# Patient Record
Sex: MALE | Race: WHITE | NOT HISPANIC OR LATINO | Employment: FULL TIME | URBAN - METROPOLITAN AREA
[De-identification: names, ages, dates, MRNs, and addresses within clinical notes are randomized per-mention and may not be internally consistent; named-entity substitution may affect disease eponyms.]

---
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
## 2018-02-07 ENCOUNTER — PRE VISIT (OUTPATIENT)
Dept: NEUROLOGY | Facility: CLINIC | Age: 18
End: 2018-02-07

## 2018-02-07 NOTE — TELEPHONE ENCOUNTER
PREVISIT INFORMATION                                                    Fernando Mcdonald scheduled for future visit at [facility] specialty clinics.    Patient is scheduled to see Dr. Vyas on 2/9  Reason for visit: muscle twitching/jerking, back pain from degenerative/bulging disk, medically discharged from  due to bulging disk    Referring provider THERON Peck  Has patient seen previous specialist Dr. Ward  Medical Records:  Notes from referring provider in EPIC, MRIs in [facility]/PACS     REVIEW                                                      New patient packet mailed to patient: will come early to fill out   Medication reconciliation complete: No      Current Outpatient Prescriptions   Medication Sig Dispense Refill     cholecalciferol (VITAMIN D) 400 UNIT TABS tablet Take 400 Units by mouth daily       vitamin B complex with vitamin C (VITAMIN  B COMPLEX) TABS tablet Take 3 tablets by mouth daily       DiphenhydrAMINE HCl (BENADRYL PO) Take 25 mg by mouth daily as needed PER PATIENT HAS NOT TAKEN THIS YEAR DUE TO ALLERGY BEING UNDER CONTROL         Allergies: Cefzil [cefprozil]        PLAN/FOLLOW-UP NEEDED                                                          Patient Reminders Given:  Please, make sure you bring an updated list of your medications.   If you are having a procedure, please, present 15 minutes early.  If you need to cancel or reschedule,please call [phone].    Orly Petersen

## 2018-02-09 ENCOUNTER — OFFICE VISIT (OUTPATIENT)
Dept: NEUROLOGY | Facility: CLINIC | Age: 18
End: 2018-02-09
Attending: PHYSICIAN ASSISTANT
Payer: COMMERCIAL

## 2018-02-09 VITALS
HEART RATE: 89 BPM | WEIGHT: 203 LBS | SYSTOLIC BLOOD PRESSURE: 133 MMHG | OXYGEN SATURATION: 99 % | DIASTOLIC BLOOD PRESSURE: 79 MMHG | BODY MASS INDEX: 31.86 KG/M2 | HEIGHT: 67 IN

## 2018-02-09 DIAGNOSIS — M54.50 CHRONIC MIDLINE LOW BACK PAIN WITHOUT SCIATICA: Primary | ICD-10-CM

## 2018-02-09 DIAGNOSIS — G89.29 CHRONIC MIDLINE LOW BACK PAIN WITHOUT SCIATICA: Primary | ICD-10-CM

## 2018-02-09 PROCEDURE — 99204 OFFICE O/P NEW MOD 45 MIN: CPT | Performed by: PSYCHIATRY & NEUROLOGY

## 2018-02-09 ASSESSMENT — PAIN SCALES - GENERAL: PAINLEVEL: SEVERE PAIN (6)

## 2018-02-09 NOTE — PROGRESS NOTES
Department of Neurology  Movement Disorders Division   Initial Clinic Evaluation     Patient: Fernando Mcdonald   MRN: [id number]   : 2000   Date of Visit: 2018     Referring Physician: Mauricio    Reason for Referral: Muscle jerking    Impression:      #1  Functional movement disorder of legs  #2  Chronic low back pain    I was able to observe his leg jerking.  During the exam his right leg began to move involuntarily.  It was a rhythmic tapping at about 2 Hz of his right foot.  During this time there was no movement of his right arm or face and he was alert and oriented.  When he tapped with the left foot the right foot movement was distracted.  I think this is a strong indication of a functional movement disorder.    Recommendations:     #1  Patient was reassured that the movement disorder in his legs is not worrisome.  The findings are strongly those of a functional movement disorder.  This is happening in the setting of chronic pain.  I do not believe that there are any medications that would be of benefit.  I recommended physical therapy.  #2  The patient will undergo a course of physical therapy for his low back pain.  I told him that I thought his pain will improve so with his movement disorder.  I will see him back in 2 months and if his pain has not improved he would like to be referred to a pain management center.  He does not want to consider injections for low back pain.      Please call or write with questions or concerns,    Sincerely yours,    Jan Vyas MD      History of Present Illness  Mr. Mcdonald is a 17 year old ambidextrous male.  This is an extremely pleasant young gentleman.  He is currently working as a manager at [employer].  He says he has had chronic low back pain for 5 years since the age of 12.  He says this runs in his family as his mother has chronic low back pain.  The pain increased in in 2017 he had an MRI of the lumbar spine.  I reviewed this with him.  There is  a central midline disc at L5-S1.  This minimally indents the thecal sac.  There is definitely no spinal stenosis.  There is no root compression.  The patient says he then entered the  and went through training which caused his pain to increased significantly.  He now complains of severe low back pain with radiating radiation to the posterior thighs.  There is no radiation of the calves.  With the pain he gets numbness of his anterior thighs.  There is no numbness of the feet.  His legs fatigue but are not paralyzed.  There are no falls.  There is no fasciculation or atrophy.    The patient saw Dr. Ward for this back in June 2017 when he had his MRI.  Dr. Ward in addition to the ferritin vitamin B12 blood count basic metabolic panel hepatic panel TSH and vitamin D screening.  All of this was normal.    The patient's neurological screening is otherwise normal as recorded below.    Past Medical History:   Past Medical History:   Diagnosis Date     Anxiety     Not taking medication. Saw psychiatrist in 2011/2012. No specific diagnosis and no specific treatment as per patient     Concussion 9/25/2012     Low back pain 2014    No back injury     Syncope 9/25/2012       Past Surgical History:   Past Surgical History:   Procedure Laterality Date     ABDOMEN SURGERY  1999    Pyloric stenosis as infant        Medications:  Current Outpatient Prescriptions   Medication Sig Dispense Refill     BENZONATATE PO Take by mouth every 4 hours as needed for cough       IBUPROFEN PO Take 2-4 tablets by mouth as needed for moderate pain       Naproxen Sodium (ALEVE PO) Take by mouth as needed for moderate pain       ALBUTEROL IN 2-4 puffs every 4 hours as needed       DiphenhydrAMINE HCl (BENADRYL PO) Take 25 mg by mouth daily as needed PER PATIENT HAS NOT TAKEN THIS YEAR DUE TO ALLERGY BEING UNDER CONTROL            Movement Disorder-related Medications                                                                                                                                                              Allergies: is allergic to cefzil [cefprozil].    Social History:   Social History     Social History     Marital status: Single     Spouse name: N/A     Number of children: 0     Years of education: 11     Occupational History     [employer]     1 day     Social History Main Topics     Smoking status: Current Every Day Smoker     Packs/day: 0.50     Types: Cigarettes     Smokeless tobacco: Former User      Comment: Started smoking he was 10 yrs old     Alcohol use No     Drug use: No     Sexual activity: No     Other Topics Concern     Not on file     Social History Narrative    Presently living with biologic mother, who gave pt up for adoption at birth.  Adoptive birth mother  when he was 4, of pancreatic cancer, adoptive father remarried, and this woman forced him out of the house- he re-connected with biologic mother 2014 and now living there.         Family History:  Family History   Problem Relation Age of Onset     Hypertension Mother      Asthma Mother      Thyroid Disease Mother      Breast Cancer No family hx of      Colon Cancer No family hx of      Prostate Cancer No family hx of        ROS:  General:  Fever : no, Chills: no, Sweats: no, Fatigue: YES, ,Weight loss: no  Cardiovascular  Chest Pains: no, Palpitations: no, Edema: no, Shortness of breath: no  Respiratory  Cough: no  Gastrointestinal  Nausea: no, Vomiting: no, Diarrhea: no, Constipation: no  Genitourinary  Dysuria: no, Frequency: no, Urgency: no,  Nocturia: no, Incontinence: no Women:  Abnormal vaginal bleeding: no  Musculoskeletal  Back pain: no, Neck Pain: no  Joint pain, swelling, redness: no, Stiffness: no  Skin  Rash: no, Itching: no,  Suspicious lesions: no  Psychiatric  Depression: no, Anxiety/Panic: no  Endocrine  Cold intolerance: no, Heat intolerance: no, Excessive thirst: no  Hematologic/LymphatiAbnormal bruising,  bleeding: no ,Enlarged lymph nodes: {.:[id number]  Allergic/Immunologic  Hives (Urticaria): no, HIV exposure: no    Neurologic  Visual loss: no, Double vision: no, Headache: no, Loss of consciousness:  no, Seizure: no, Fainting (syncope): no, Dysarthria: no, Dysphagia:  no, Vertigo:  no, Weakness: YES, Atrophy: no, Twitches: no, Lhermitte's: no, Numbness or tingling: YES, Handwriting change: no, Tremors: no, Involuntary movements: YES, Imbalance: no, Abnormal gait:  YES, Falls :no, Memory loss: no, RBD: no, Sleep disorder: no, Hallucination: no, Loss of concentration: no,  Behavioral change: no,  Loss of motivation: no      Comprehensive Neurologic Exam    Mental Status Exam   The patient is alert, oriented and exhibits no difficulty with cognition or memory.  A formal short test of mental status was performed.     Neurovascular         Speech and Language   Right Left     Carotid Bruit Absent Absent  Speech is normal.   Dorsalis Pedis Pulse Normal Normal  Description   Posterior Tibial Pulse Normal Normal  Aphasia is absent     Cranial Nerve Exam                 Right Left   Right Left   II                                        Normal Normal  Hearing (VIII) Normal Normal      III, IV, V!                   Normal Normal  Nystagmus (VIII) Normal Normal   EOM description                              Gag (IX, X) Normal Normal   Facial Sensation (V) Normal Normal  SCM (XI) Normal Normal   Muscles of Mastication (V) Normal Normal  Trapezius (XI) Normal Normal   Facial Strength (VII) Normal Normal  Tongue (XII) Normal Normal     Motor Exam  Strength (*/5 grading)  Muscle                  Right Left  Muscle Right Left   Frontalis                                           Normal Normal  Iliopsoas Normal    Normal          Orbicularis Oculi                     Normal Normal  Quadrideps Normal    Normal        Orbicularis Oris                         Normal Normal  Anterior Tibial Normal Normal      Deltoid Normal Normal   Gastrocnemius Normal    Normal   Biceps Normal Normal  Extensor Hallucis Longus Normal    Normal   Triceps Normal Normal  Toe Extensors Normal    Normal   EDC Normal Normal  Toe Flexor Normal    Normal   Finger Flexors Normal Normal  Other Normal Normal   First Dorsal Interosseous Normal Normal  Other Normal Normal   Hypothenar Normal Normal  Other Normal Normal   Thenar Normal Normal  Other Normal Normal     Reflexes      Tone   Right Left   Right Left   Biceps Normal Normal  Spasticity (S)  Rigidity (R)     Triceps Normal Normal  Neck     Brachioradialis Normal Normal  Arm     Quadriceps Normal Normal  Leg     Ankle Normal Normal       Babkinski Flexor Flexor         AMR       Coordination   Right Left   Right Left   Fingers Normal Normal  Finger nose finger Normal Normal   Hand Normal Normal  Drift Normal Normal   Leg Normal Normal  Heel Sotelo Normal Normal   Foot Normal Normal  Other     Other               Involuntary Movements    Gait and Station  None            Right Left  Stand & Sit Normal   (Movement type) Normal Normal  Gait Normal   (Movement type) Normal Normal  Tandem Normal   (Movement type) Normal Normal  Romberg Absent     During the exam the patient developed involuntary movements of the right leg.  This was like a shaking movement of the leg.  There were no movements of the arm or face and the patient was alert and able to answer questions.  When I had him tap with the left leg at a slower frequency the right leg just shaking stopped  Sensory Exam          Right Left    Pin Normal Normal    Vibration Normal Normal    Joint position Normal Normal    Other             Coding statement:     Duration of  Services: face-to-face 45 min.   Greater than 50% of this visit was spent in counseling and coordination of care.        Number of diagnoses:[id number]  ManagementDiagnostic tests orders or reviewed.   Complexity of medical data:Outside records reviewed.  Radiology images reviewed by myself.   Review/order clinical lab tests.   Risk

## 2018-02-09 NOTE — NURSING NOTE
"Fernando Mcdonald's goals for this visit include: consult  He requests these members of his care team be copied on today's visit information:     PCP: Kyler Nagy    Referring Provider:  Xenia Martínez PA-C  [address] [place] [address]    Chief Complaint   Patient presents with     Consult       Initial /79  Pulse 89  Ht 1.695 m (5' 6.75\")  Wt 92.1 kg (203 lb)  SpO2 99%  BMI 32.03 kg/m2 Estimated body mass index is 32.03 kg/(m^2) as calculated from the following:    Height as of this encounter: 1.695 m (5' 6.75\").    Weight as of this encounter: 92.1 kg (203 lb).  Medication Reconciliation: complete    Do you need any medication refills at today's visit? n  "

## 2018-02-09 NOTE — MR AVS SNAPSHOT
After Visit Summary   2/9/2018    Fernando Mcdonald    MRN: [id number]           Patient Information     Date Of Birth          2000        Visit Information        Provider Department      2/9/2018 2:00 PM Jan Vyas MD [facility]        Today's Diagnoses     Chronic midline low back pain without sciatica    -  1      Care Instructions    #1  Physical therapy for low back pain  #2  Return to clinic 2 months          Follow-ups after your visit        Additional Services     PHYSICAL THERAPY REFERRAL       *This therapy referral will be filtered to a centralized scheduling office at [facility] and the patient will receive a call to schedule an appointment at a [facility] location most convenient for them. *     [facility] provides Physical Therapy evaluation and treatment and many specialty services across the [facility] system.  If requesting a specialty program, please choose from the list below.    If you have not heard from the scheduling office within 2 business days, please call [phone] for all locations, with the exception of [place], please call [phone].  Treatment: Evaluation & Treatment  Please be aware that coverage of these services is subject to the terms and limitations of your health insurance plan.  Call member services at your health plan with any benefit or coverage questions.                  Follow-up notes from your care team     Return in about 2 months (around 4/9/2018).      Your next 10 appointments already scheduled     Apr 13, 2018  2:30 PM CDT   Return Visit with Jan Vyas MD   [facility] ([facility])    [id number] [place] [id number]   [phone]              Future tests that were ordered for you today     Open Future Orders        Priority Expected Expires Ordered    PHYSICAL THERAPY REFERRAL Routine  6/9/2018 2/9/2018            Who to  "contact     If you have questions or need follow up information about today's clinic visit or your schedule please contact [facility] directly at [phone].  Normal or non-critical lab and imaging results will be communicated to you by MyChart, letter or phone within 4 business days after the clinic has received the results. If you do not hear from us within 7 days, please contact the clinic through MyChart or phone. If you have a critical or abnormal lab result, we will notify you by phone as soon as possible.  Submit refill requests through [employer] or call your pharmacy and they will forward the refill request to us. Please allow 3 business days for your refill to be completed.          Additional Information About Your Visit        [employer] Information     [employer] is an electronic gateway that provides easy, online access to your medical records. With [employer], you can request a clinic appointment, read your test results, renew a prescription or communicate with your care team.     To sign up for [employer] visit the website at [email or web address]   You will be asked to enter the access code listed below, as well as some personal information. Please follow the directions to create your username and password.     Your access code is: [email or web address]  Expires: 5/10/2018  2:52 PM     Your access code will  in 90 days. If you need help or a new code, please contact your [facility] or call [phone] for assistance.        Care EveryWhere ID     This is your Care EveryWhere ID. This could be used by other organizations to access your [facility] medical records  [id number]        Your Vitals Were     Pulse Height Pulse Oximetry BMI (Body Mass Index)          89 1.695 m (5' 6.75\") 99% 32.03 kg/m2         Blood Pressure from Last 3 Encounters:   18 133/79   17 130/76   17 132/76    Weight from Last 3 Encounters:   18 92.1 kg (203 lb) (94 " %)*   06/13/17 88 kg (194 lb) (92 %)*   06/13/17 88.1 kg (194 lb 3.2 oz) (92 %)*     * Growth percentiles are based on CDC 2-20 Years data.               Primary Care Provider Office Phone # Fax #    Kyler Hunter [facility] [phone]       [address] [facility] [facility]  [place] [address]        Equal Access to Services     NICOLE BROWN : Hadii aad ku hadasho Soomaali, waaxda luqadaha, qaybta kaalmada adeegyada, waxay idiin hayaan adeeg kharash la'aysha . So [facility] [phone].    ATENCIÓN: Si habla español, tiene a jacobson disposición servicios gratuitos de asistencia lingüística. Llame al [phone].    We comply with applicable federal civil rights laws and [place] laws. We do not discriminate on the basis of race, color, national origin, age, disability, sex, sexual orientation, or gender identity.            Thank you!     Thank you for choosing [facility]  for your care. Our goal is always to provide you with excellent care. Hearing back from our patients is one way we can continue to improve our services. Please take a few minutes to complete the written survey that you may receive in the mail after your visit with us. Thank you!             Your Updated Medication List - Protect others around you: Learn how to safely use, store and throw away your medicines at www.disposemymeds.org.          This list is accurate as of 2/9/18  2:52 PM.  Always use your most recent med list.                   Brand Name Dispense Instructions for use Diagnosis    ALBUTEROL IN      2-4 puffs every 4 hours as needed        ALEVE PO      Take by mouth as needed for moderate pain        BENADRYL PO      Take 25 mg by mouth daily as needed PER PATIENT HAS NOT TAKEN THIS YEAR DUE TO ALLERGY BEING UNDER CONTROL        BENZONATATE PO      Take by mouth every 4 hours as needed for cough        IBUPROFEN PO      Take 2-4 tablets by mouth as needed for moderate pain

## 2018-02-09 NOTE — LETTER
2018         RE: Fernando Mcdonald  [address] [facility]   [facility] [address]        Dear Colleague,    Thank you for referring your patient, Fernando Mcdonald, to the [facility]. Please see a copy of my visit note below.    Department of Neurology  Movement Disorders Division   Initial Clinic Evaluation     Patient: Fernando Mcdonald   MRN: [id number]   : 2000   Date of Visit: 2018     Referring Physician: Mauricio    Reason for Referral: Muscle jerking    Impression:      #1  Functional movement disorder of legs  #2  Chronic low back pain    I was able to observe his leg jerking.  During the exam his right leg began to move involuntarily.  It was a rhythmic tapping at about 2 Hz of his right foot.  During this time there was no movement of his right arm or face and he was alert and oriented.  When he tapped with the left foot the right foot movement was distracted.  I think this is a strong indication of a functional movement disorder.    Recommendations:     #1  Patient was reassured that the movement disorder in his legs is not worrisome.  The findings are strongly those of a functional movement disorder.  This is happening in the setting of chronic pain.  I do not believe that there are any medications that would be of benefit.  I recommended physical therapy.  #2  The patient will undergo a course of physical therapy for his low back pain.  I told him that I thought his pain will improve so with his movement disorder.  I will see him back in 2 months and if his pain has not improved he would like to be referred to a pain management center.  He does not want to consider injections for low back pain.      Please call or write with questions or concerns,    Sincerely yours,    Jan Vyas MD      History of Present Illness  Mr. Mcdonald is a 17 year old ambidextrous male.  This is an extremely pleasant young gentleman.  He is currently working as a manager at [employer].  He says  he has had chronic low back pain for 5 years since the age of 12.  He says this runs in his family as his mother has chronic low back pain.  The pain increased in in June 2017 he had an MRI of the lumbar spine.  I reviewed this with him.  There is a central midline disc at L5-S1.  This minimally indents the thecal sac.  There is definitely no spinal stenosis.  There is no root compression.  The patient says he then entered the  and went through training which caused his pain to increased significantly.  He now complains of severe low back pain with radiating radiation to the posterior thighs.  There is no radiation of the calves.  With the pain he gets numbness of his anterior thighs.  There is no numbness of the feet.  His legs fatigue but are not paralyzed.  There are no falls.  There is no fasciculation or atrophy.    The patient saw Dr. Ward for this back in June 2017 when he had his MRI.  Dr. Ward in addition to the ferritin vitamin B12 blood count basic metabolic panel hepatic panel TSH and vitamin D screening.  All of this was normal.    The patient's neurological screening is otherwise normal as recorded below.    Past Medical History:   Past Medical History:   Diagnosis Date     Anxiety     Not taking medication. Saw psychiatrist in 2011/2012. No specific diagnosis and no specific treatment as per patient     Concussion 9/25/2012     Low back pain 2014    No back injury     Syncope 9/25/2012       Past Surgical History:   Past Surgical History:   Procedure Laterality Date     ABDOMEN SURGERY  1999    Pyloric stenosis as infant        Medications:  Current Outpatient Prescriptions   Medication Sig Dispense Refill     BENZONATATE PO Take by mouth every 4 hours as needed for cough       IBUPROFEN PO Take 2-4 tablets by mouth as needed for moderate pain       Naproxen Sodium (ALEVE PO) Take by mouth as needed for moderate pain       ALBUTEROL IN 2-4 puffs every 4 hours as needed        DiphenhydrAMINE HCl (BENADRYL PO) Take 25 mg by mouth daily as needed PER PATIENT HAS NOT TAKEN THIS YEAR DUE TO ALLERGY BEING UNDER CONTROL            Movement Disorder-related Medications                                                                                                                                                             Allergies: is allergic to cefzil [cefprozil].    Social History:   Social History     Social History     Marital status: Single     Spouse name: N/A     Number of children: 0     Years of education: 11     Occupational History     Guest [employer]     1 day     Social History Main Topics     Smoking status: Current Every Day Smoker     Packs/day: 0.50     Types: Cigarettes     Smokeless tobacco: Former User      Comment: Started smoking he was 10 yrs old     Alcohol use No     Drug use: No     Sexual activity: No     Other Topics Concern     Not on file     Social History Narrative    Presently living with biologic mother, who gave pt up for adoption at birth.  Adoptive birth mother  when he was 4, of pancreatic cancer, adoptive father remarried, and this woman forced him out of the house- he re-connected with biologic mother 2014 and now living there.         Family History:  Family History   Problem Relation Age of Onset     Hypertension Mother      Asthma Mother      Thyroid Disease Mother      Breast Cancer No family hx of      Colon Cancer No family hx of      Prostate Cancer No family hx of        ROS:  General:  Fever : no, Chills: no, Sweats: no, Fatigue: YES, ,Weight loss: no  Cardiovascular  Chest Pains: no, Palpitations: no, Edema: no, Shortness of breath: no  Respiratory  Cough: no  Gastrointestinal  Nausea: no, Vomiting: no, Diarrhea: no, Constipation: no  Genitourinary  Dysuria: no, Frequency: no, Urgency: no,  Nocturia: no, Incontinence: no Women:  Abnormal vaginal bleeding: no  Musculoskeletal  Back pain: no, Neck Pain:  no  Joint pain, swelling, redness: no, Stiffness: no  Skin  Rash: no, Itching: no,  Suspicious lesions: no  Psychiatric  Depression: no, Anxiety/Panic: no  Endocrine  Cold intolerance: no, Heat intolerance: no, Excessive thirst: no  Hematologic/LymphatiAbnormal bruising, bleeding: no ,Enlarged lymph nodes: {.:[id number]  Allergic/Immunologic  Hives (Urticaria): no, HIV exposure: no    Neurologic  Visual loss: no, Double vision: no, Headache: no, Loss of consciousness:  no, Seizure: no, Fainting (syncope): no, Dysarthria: no, Dysphagia:  no, Vertigo:  no, Weakness: YES, Atrophy: no, Twitches: no, Lhermitte's: no, Numbness or tingling: YES, Handwriting change: no, Tremors: no, Involuntary movements: YES, Imbalance: no, Abnormal gait:  YES, Falls :no, Memory loss: no, RBD: no, Sleep disorder: no, Hallucination: no, Loss of concentration: no,  Behavioral change: no,  Loss of motivation: no      Comprehensive Neurologic Exam    Mental Status Exam   The patient is alert, oriented and exhibits no difficulty with cognition or memory.  A formal short test of mental status was performed.     Neurovascular         Speech and Language   Right Left     Carotid Bruit Absent Absent  Speech is normal.   Dorsalis Pedis Pulse Normal Normal  Description   Posterior Tibial Pulse Normal Normal  Aphasia is absent     Cranial Nerve Exam                 Right Left   Right Left   II                                        Normal Normal  Hearing (VIII) Normal Normal      III, IV, V!                   Normal Normal  Nystagmus (VIII) Normal Normal   EOM description                              Gag (IX, X) Normal Normal   Facial Sensation (V) Normal Normal  SCM (XI) Normal Normal   Muscles of Mastication (V) Normal Normal  Trapezius (XI) Normal Normal   Facial Strength (VII) Normal Normal  Tongue (XII) Normal Normal     Motor Exam  Strength (*/5 grading)  Muscle                  Right Left  Muscle Right Left   Frontalis                                            Normal Normal  Iliopsoas Normal    Normal          Orbicularis Oculi                     Normal Normal  Quadrideps Normal    Normal        Orbicularis Oris                         Normal Normal  Anterior Tibial Normal Normal      Deltoid Normal Normal  Gastrocnemius Normal    Normal   Biceps Normal Normal  Extensor Hallucis Longus Normal    Normal   Triceps Normal Normal  Toe Extensors Normal    Normal   EDC Normal Normal  Toe Flexor Normal    Normal   Finger Flexors Normal Normal  Other Normal Normal   First Dorsal Interosseous Normal Normal  Other Normal Normal   Hypothenar Normal Normal  Other Normal Normal   Thenar Normal Normal  Other Normal Normal     Reflexes      Tone   Right Left   Right Left   Biceps Normal Normal  Spasticity (S)  Rigidity (R)     Triceps Normal Normal  Neck     Brachioradialis Normal Normal  Arm     Quadriceps Normal Normal  Leg     Ankle Normal Normal       Babkinski Flexor Flexor         AMR       Coordination   Right Left   Right Left   Fingers Normal Normal  Finger nose finger Normal Normal   Hand Normal Normal  Drift Normal Normal   Leg Normal Normal  Heel Sotelo Normal Normal   Foot Normal Normal  Other     Other               Involuntary Movements    Gait and Station  None            Right Left  Stand & Sit Normal   (Movement type) Normal Normal  Gait Normal   (Movement type) Normal Normal  Tandem Normal   (Movement type) Normal Normal  Romberg Absent     During the exam the patient developed involuntary movements of the right leg.  This was like a shaking movement of the leg.  There were no movements of the arm or face and the patient was alert and able to answer questions.  When I had him tap with the left leg at a slower frequency the right leg just shaking stopped  Sensory Exam          Right Left    Pin Normal Normal    Vibration Normal Normal    Joint position Normal Normal    Other             Coding statement:     Duration of  Services: face-to-face 45 min.    Greater than 50% of this visit was spent in counseling and coordination of care.        Number of diagnoses:[id number]  ManagementDiagnostic tests orders or reviewed.   Complexity of medical data:Outside records reviewed.  Radiology images reviewed by myself.  Review/order clinical lab tests.   Risk                         Again, thank you for allowing me to participate in the care of your patient.        Sincerely,        Jan Vyas MD

## 2018-02-16 ENCOUNTER — THERAPY VISIT (OUTPATIENT)
Dept: PHYSICAL THERAPY | Facility: CLINIC | Age: 18
End: 2018-02-16
Payer: COMMERCIAL

## 2018-02-16 DIAGNOSIS — G89.29 CHRONIC LOW BACK PAIN WITH BILATERAL SCIATICA, UNSPECIFIED BACK PAIN LATERALITY: Primary | ICD-10-CM

## 2018-02-16 DIAGNOSIS — M54.42 CHRONIC LOW BACK PAIN WITH BILATERAL SCIATICA, UNSPECIFIED BACK PAIN LATERALITY: Primary | ICD-10-CM

## 2018-02-16 DIAGNOSIS — M54.41 CHRONIC LOW BACK PAIN WITH BILATERAL SCIATICA, UNSPECIFIED BACK PAIN LATERALITY: Primary | ICD-10-CM

## 2018-02-16 PROBLEM — M54.50 CHRONIC BILATERAL LOW BACK PAIN WITHOUT SCIATICA: Status: RESOLVED | Noted: 2018-02-16 | Resolved: 2018-02-16

## 2018-02-16 PROBLEM — M54.50 CHRONIC BILATERAL LOW BACK PAIN WITHOUT SCIATICA: Status: ACTIVE | Noted: 2018-02-16

## 2018-02-16 PROCEDURE — 97162 PT EVAL MOD COMPLEX 30 MIN: CPT | Mod: GP | Performed by: PHYSICAL THERAPIST

## 2018-02-16 PROCEDURE — 97110 THERAPEUTIC EXERCISES: CPT | Mod: GP | Performed by: PHYSICAL THERAPIST

## 2018-02-16 NOTE — MR AVS SNAPSHOT
After Visit Summary   2/16/2018    Fernando Mcdonald    MRN: [id number]           Patient Information     Date Of Birth          2000        Visit Information        Provider Department      2/16/2018 1:30 PM Hema Magana PT [facility] [place] [facility]        Today's Diagnoses     Chronic low back pain with bilateral sciatica, unspecified back pain laterality    -  1       Follow-ups after your visit        Your next 10 appointments already scheduled     Feb 23, 2018  8:20 AM CST   [facility] with Hema Magana PT   [facility] [place] [facility] ([facility] [place])    [address]  [place] [address]   [phone]            Mar 02, 2018  2:10 PM CST   [facility] with Hema Magana PT   [facility] [place] [facility] ([facility] [place])    [address]  [place] [address]   [phone]            Apr 13, 2018  2:30 PM CDT   Return Visit with Jan Vyas MD   [facility]    [address] [place] [id number] [phone]              Who to contact     If you have questions or need follow up information about today's clinic visit or your schedule please contact [facility] directly at [phone].  Normal or non-critical lab and imaging results will be communicated to you by MyChart, letter or phone within 4 business days after the clinic has received the results. If you do not hear from us within 7 days, please contact the clinic through MyChart or phone. If you have a critical or abnormal lab result, we will notify you by phone as soon as possible.  Submit refill requests through [employer] or call your pharmacy and they will forward the refill request to us. Please allow 3 business days for your refill to be completed.          Additional Information About Your Visit        MyChart Information      "[employer] lets you send messages to your doctor, view your test results, renew your prescriptions, schedule appointments and more. To sign up, go to [email or web address] . Click on \"Log in\" on the left side of the screen, which will take you to the Welcome page. Then click on \"Sign up Now\" on the right side of the page.     You will be asked to enter the access code listed below, as well as some personal information. Please follow the directions to create your username and password.     Your access code is: [email or web address]  Expires: 5/10/2018  2:52 PM     Your access code will  in 90 days. If you need help or a new code, please call your [place] clinic or [phone].        Care EveryWhere ID     This is your Care EveryWhere ID. This could be used by other organizations to access your [place] medical records  [id number]         Blood Pressure from Last 3 Encounters:   18 133/79   17 130/76   17 132/76    Weight from Last 3 Encounters:   18 92.1 kg (203 lb) (94 %)*   17 88 kg (194 lb) (92 %)*   17 88.1 kg (194 lb 3.2 oz) (92 %)*     * Growth percentiles are based on [facility] 2-20 Years data.              We Performed the Following     HC PT EVAL, MODERATE COMPLEXITY     THERAPEUTIC EXERCISES        Primary Care Provider Office Phone # Fax #    [facility] [phone]       [address] [facility] [address]        Equal Access to Services     [facility] : Hadii aad ku hadasho Soomaali, waaxda luqadaha, qaybta kaalmada adeegyada, marianna gallegos. So [facility] [phone].    ATENCIÓN: Si habla español, tiene a jacobson disposición servicios gratuitos de asistencia lingüística. Llame al [phone].    We comply with applicable federal civil rights laws and [place] laws. We do not discriminate on the basis of race, color, national origin, age, disability, sex, sexual orientation, or gender identity.            Thank you!     " Thank you for choosing [facility]  for your care. Our goal is always to provide you with excellent care. Hearing back from our patients is one way we can continue to improve our services. Please take a few minutes to complete the written survey that you may receive in the mail after your visit with us. Thank you!             Your Updated Medication List - Protect others around you: Learn how to safely use, store and throw away your medicines at www.disposemymeds.org.          This list is accurate as of 2/16/18  5:08 PM.  Always use your most recent med list.                   Brand Name Dispense Instructions for use Diagnosis    ALBUTEROL IN      2-4 puffs every 4 hours as needed        ALEVE PO      Take by mouth as needed for moderate pain        BENADRYL PO      Take 25 mg by mouth daily as needed PER PATIENT HAS NOT TAKEN THIS YEAR DUE TO ALLERGY BEING UNDER CONTROL        BENZONATATE PO      Take by mouth every 4 hours as needed for cough        IBUPROFEN PO      Take 2-4 tablets by mouth as needed for moderate pain

## 2018-02-16 NOTE — PROGRESS NOTES
[facility] Initial Evaluation  Subjective:  Patient is a 17 year old male presenting with rehab back hpi. The history is provided by the patient. No  was used.   Fernando Mcdonald is a 17 year old male with a lumbar condition.  Condition occurred with:  Insidious onset.  Condition occurred: for unknown reasons.  This is a chronic condition  Chronic LBP for over 4 years for unknown reasons.  Pt was referred to PT on 2/9/18.    Patient reports pain:  Lower lumbar spine.  Radiates to:  Foot right and foot left.   and is intermittent and reported as 8/10.  Associated symptoms:  Loss of strength, tingling, numbness and loss of motion/stiffness. Pain is the same all the time.  Symptoms are exacerbated by bending, twisting, sitting and standing and relieved by rest (lying down).    Special tests:  MRI (L5-S1: Disc bulge with superimposed left central protrusion).      General health as reported by patient is good.  Pertinent medical history includes:  Smoking.  Medical allergies: yes (Cefzil).  Other surgeries include:  Other.    Current occupation is .  Patient is working in normal job without restrictions.  Primary job tasks include:  Prolonged sitting.    Barriers include:  None as reported by the patient.    Red flags:  Changes in bowel and bladder habits.                        Objective:  System         Lumbar/SI Evaluation  ROM:    AROM Lumbar:   Flexion:          Moderate loss (++)  Ext:                    Major loss (+++)   Side Bend:        Left:  Moderate loss (++)    Right:  Moderate loss (++)  Rotation:           Left:     Right:   Side Glide:        Left:     Right:           Lumbar Myotomes:  normal            Lumbar DTR's:  normal        Lumbar Dermtomes:  normal                Neural Tension/Mobility:    Left side:  SLR positive.   Right side:   SLR positive.  Lumbar Palpation:    Tenderness present at Left:    Quadratus Lumborum; Erector Spinae and  PSIS  Tenderness present at Right: Quadratus Lumborum; Erector Spinae and PSIS    Lumbar Provocation:    Left positive with:  PROM hip  Right positive with: PROM hip                                                 General     ROS    Assessment/Plan:    Patient is a 17 year old male with lumbar complaints.    Patient has the following significant findings with corresponding treatment plan.                Diagnosis 1:  LBP  Pain -  manual therapy, self management, education, directional preference exercise and home program  Decreased ROM/flexibility - manual therapy, therapeutic exercise and home program  Decreased function - therapeutic activities and home program  Impaired posture - neuro re-education and home program    Therapy Evaluation Codes:   1) History comprised of:   Personal factors that impact the plan of care:      Anxiety, Overall behavior pattern, Past/current experiences and Time since onset of symptoms.    Comorbidity factors that impact the plan of care are:      Pain at night/rest and Smoking.     Medications impacting care: None.  2) Examination of Body Systems comprised of:   Body structures and functions that impact the plan of care:      Lumbar spine.   Activity limitations that impact the plan of care are:      Bathing, Lifting, Sitting and Walking.  3) Clinical presentation characteristics are:   Evolving/Changing.  4) Decision-Making    Moderate complexity using standardized patient assessment instrument and/or measureable assessment of functional outcome.  Cumulative Therapy Evaluation is: Moderate complexity.    Previous and current functional limitations:  (See Goal Flow Sheet for this information)    Short term and Long term goals: (See Goal Flow Sheet for this information)     Communication ability:  Patient appears to be able to clearly communicate and understand verbal and written communication and follow directions correctly.  Treatment Explanation - The following has been discussed  with the patient:   RX ordered/plan of care  Anticipated outcomes  Possible risks and side effects  This patient would benefit from PT intervention to resume normal activities.   Rehab potential is fair due to chronicity and low tolerance for activity currently.    Frequency:  1 X week, once daily  Duration:  for 6 weeks  Discharge Plan:  Achieve all LTG.  Independent in home treatment program.  Reach maximal therapeutic benefit.    Please refer to the daily flowsheet for treatment today, total treatment time and time spent performing 1:1 timed codes.

## 2018-02-23 ENCOUNTER — THERAPY VISIT (OUTPATIENT)
Dept: PHYSICAL THERAPY | Facility: CLINIC | Age: 18
End: 2018-02-23
Payer: COMMERCIAL

## 2018-02-23 ENCOUNTER — OFFICE VISIT (OUTPATIENT)
Dept: INTERNAL MEDICINE | Facility: CLINIC | Age: 18
End: 2018-02-23
Payer: COMMERCIAL

## 2018-02-23 VITALS
DIASTOLIC BLOOD PRESSURE: 81 MMHG | WEIGHT: 203 LBS | RESPIRATION RATE: 16 BRPM | TEMPERATURE: 98.4 F | BODY MASS INDEX: 31.86 KG/M2 | HEIGHT: 67 IN | HEART RATE: 106 BPM | SYSTOLIC BLOOD PRESSURE: 126 MMHG

## 2018-02-23 DIAGNOSIS — Z87.891 PERSONAL HISTORY OF NICOTINE DEPENDENCE: Primary | ICD-10-CM

## 2018-02-23 DIAGNOSIS — G89.29 CHRONIC BILATERAL LOW BACK PAIN WITHOUT SCIATICA: ICD-10-CM

## 2018-02-23 DIAGNOSIS — G89.29 CHRONIC LOW BACK PAIN WITH BILATERAL SCIATICA, UNSPECIFIED BACK PAIN LATERALITY: ICD-10-CM

## 2018-02-23 DIAGNOSIS — M54.50 CHRONIC BILATERAL LOW BACK PAIN WITHOUT SCIATICA: ICD-10-CM

## 2018-02-23 DIAGNOSIS — M54.41 CHRONIC LOW BACK PAIN WITH BILATERAL SCIATICA, UNSPECIFIED BACK PAIN LATERALITY: ICD-10-CM

## 2018-02-23 DIAGNOSIS — R35.89 POLYURIA: ICD-10-CM

## 2018-02-23 DIAGNOSIS — M54.42 CHRONIC LOW BACK PAIN WITH BILATERAL SCIATICA, UNSPECIFIED BACK PAIN LATERALITY: ICD-10-CM

## 2018-02-23 DIAGNOSIS — R63.1 POLYDIPSIA: ICD-10-CM

## 2018-02-23 DIAGNOSIS — F41.9 ANXIETY: ICD-10-CM

## 2018-02-23 LAB
ALBUMIN UR-MCNC: NEGATIVE MG/DL
APPEARANCE UR: CLEAR
BILIRUB UR QL STRIP: ABNORMAL
COLOR UR AUTO: YELLOW
GLUCOSE UR STRIP-MCNC: NEGATIVE MG/DL
HBA1C MFR BLD: 5.5 % (ref 4.3–6)
HGB UR QL STRIP: NEGATIVE
KETONES UR STRIP-MCNC: ABNORMAL MG/DL
LEUKOCYTE ESTERASE UR QL STRIP: NEGATIVE
NITRATE UR QL: NEGATIVE
PH UR STRIP: 5.5 PH (ref 5–7)
SOURCE: ABNORMAL
SP GR UR STRIP: 1.02 (ref 1–1.03)
UROBILINOGEN UR STRIP-ACNC: 1 EU/DL (ref 0.2–1)

## 2018-02-23 PROCEDURE — 99214 OFFICE O/P EST MOD 30 MIN: CPT | Performed by: INTERNAL MEDICINE

## 2018-02-23 PROCEDURE — 36415 COLL VENOUS BLD VENIPUNCTURE: CPT | Performed by: INTERNAL MEDICINE

## 2018-02-23 PROCEDURE — 97110 THERAPEUTIC EXERCISES: CPT | Mod: GP | Performed by: PHYSICAL THERAPIST

## 2018-02-23 PROCEDURE — 83036 HEMOGLOBIN GLYCOSYLATED A1C: CPT | Performed by: INTERNAL MEDICINE

## 2018-02-23 PROCEDURE — 97112 NEUROMUSCULAR REEDUCATION: CPT | Mod: GP | Performed by: PHYSICAL THERAPIST

## 2018-02-23 PROCEDURE — 81003 URINALYSIS AUTO W/O SCOPE: CPT | Performed by: INTERNAL MEDICINE

## 2018-02-23 PROCEDURE — 99406 BEHAV CHNG SMOKING 3-10 MIN: CPT | Performed by: INTERNAL MEDICINE

## 2018-02-23 ASSESSMENT — ANXIETY QUESTIONNAIRES
IF YOU CHECKED OFF ANY PROBLEMS ON THIS QUESTIONNAIRE, HOW DIFFICULT HAVE THESE PROBLEMS MADE IT FOR YOU TO DO YOUR WORK, TAKE CARE OF THINGS AT HOME, OR GET ALONG WITH OTHER PEOPLE: SOMEWHAT DIFFICULT
GAD7 TOTAL SCORE: 14
5. BEING SO RESTLESS THAT IT IS HARD TO SIT STILL: MORE THAN HALF THE DAYS
6. BECOMING EASILY ANNOYED OR IRRITABLE: SEVERAL DAYS
3. WORRYING TOO MUCH ABOUT DIFFERENT THINGS: MORE THAN HALF THE DAYS
7. FEELING AFRAID AS IF SOMETHING AWFUL MIGHT HAPPEN: SEVERAL DAYS
1. FEELING NERVOUS, ANXIOUS, OR ON EDGE: NEARLY EVERY DAY
2. NOT BEING ABLE TO STOP OR CONTROL WORRYING: MORE THAN HALF THE DAYS

## 2018-02-23 ASSESSMENT — PATIENT HEALTH QUESTIONNAIRE - PHQ9: 5. POOR APPETITE OR OVEREATING: NEARLY EVERY DAY

## 2018-02-23 NOTE — PROGRESS NOTES
Subjective:  HPI                    Objective:  System    Physical Exam    General     ROS    Assessment/Plan:    SUBJECTIVE  Subjective: Has been trying to lay EBER but can only make it about 45 seconds and then stops due to pain in both legs.  States pain is equal in both legs and goes all the way to the feet   Current Pain level: No change   Changes in function:  None     Adverse reaction to treatment or activity:  None    OBJECTIVE  Objective: Couldn't tolerate prone due to LBP and B LE pain.  Did the best with hooklying position.  Involuntary twitching of hands and LEs was observed throughout majority of the session.  This has been discussed with neurologist and no clear cause identified     ASSESSMENT  Fernando continues to require intervention to meet STG and LTG's: PT  No change of symptoms has been noted.  Response to therapy has shown lack of progress in  pain level and radicular symptoms  Progress made towards STG/LTG?  None    PLAN  Stop prone lying.  Start light core strengthening    PTA/ATC plan:  N/A    Please refer to the daily flowsheet for treatment today, total treatment time and time spent performing 1:1 timed codes.

## 2018-02-23 NOTE — MR AVS SNAPSHOT
"              After Visit Summary   2/23/2018    Fernando Mcdonald    MRN: [id number]           Patient Information     Date Of Birth          2000        Visit Information        Provider Department      2/23/2018 11:00 AM Willi Tejada MD [facility] [place]        Today's Diagnoses     Personal history of nicotine dependence    -  1    Polyuria        Polydipsia           Follow-ups after your visit        Your next 10 appointments already scheduled     Mar 02, 2018  2:10 PM CST   JEOVANNY Spine with Hema Magana PT   [facility] [place] [facility] ([facility] [place])    [address]  [address]  [place] [address] [phone]            Apr 13, 2018  2:30 PM CDT   Return Visit with Jan Vyas MD   [facility]    [address] [place] [id number] [phone]              Who to contact     Normal or non-critical lab and imaging results will be communicated to you by EQUISOhart, letter or phone within 4 business days after the clinic has received the results. If you do not hear from us within 7 days, please contact the clinic through EQUISOhart or phone. If you have a critical or abnormal lab result, we will notify you by phone as soon as possible.  Submit refill requests through CrowdStreet or call your pharmacy and they will forward the refill request to us. Please allow 3 business days for your refill to be completed.          If you need to speak with a  for additional information , please call: [phone]             Additional Information About Your Visit        CrowdStreet Information     CrowdStreet lets you send messages to your doctor, view your test results, renew your prescriptions, schedule appointments and more. To sign up, go to [email or web address] . Click on \"Log in\" on the left side of the screen, which will take you to the Welcome page. Then click on \"Sign up Now\" on the right side of " "the page.     You will be asked to enter the access code listed below, as well as some personal information. Please follow the directions to create your username and password.     Your access code is: [email or web address]  Expires: 5/10/2018  2:52 PM     Your access code will  in 90 days. If you need help or a new code, please call your [place] clinic or [phone].        Care EveryWhere ID     This is your Care EveryWhere ID. This could be used by other organizations to access your [place] medical records  [id number]        Your Vitals Were     Pulse Temperature Respirations Height BMI (Body Mass Index)       106 98.4  F (36.9  C) (Oral) 16 5' 6.75\" (1.695 m) 32.03 kg/m2        Blood Pressure from Last 3 Encounters:   18 126/81   18 133/79   17 130/76    Weight from Last 3 Encounters:   18 203 lb (92.1 kg) (94 %)*   18 203 lb (92.1 kg) (94 %)*   17 194 lb (88 kg) (92 %)*     * Growth percentiles are based on CDC 2-20 Years data.              We Performed the Following     *UA reflex to Microscopic and Culture ([place] and [facility] (except [place] and [place])     Hemoglobin A1c     SMOKING CESSATION COUNSELING 3-10 MIN        Primary Care Provider Office Phone # Fax #    [facility] [phone] [address] [facility] [address]        Equal Access to Services     LUIS A BROWN : Hadii shyam ku hadasho Sosushilali, waaxda luqadaha, qaybta kaalmada adeegyada, marianna gallegos. So [facility] [phone].    ATENCIÓN: Si habla español, tiene a jacobson disposición servicios gratuitos de asistencia lingüística. Llame al [phone].    We comply with applicable federal civil rights laws and [place] laws. We do not discriminate on the basis of race, color, national origin, age, disability, sex, sexual orientation, or gender identity.            Thank you!     Thank you for choosing [facility]  for your care. " Our goal is always to provide you with excellent care. Hearing back from our patients is one way we can continue to improve our services. Please take a few minutes to complete the written survey that you may receive in the mail after your visit with us. Thank you!             Your Updated Medication List - Protect others around you: Learn how to safely use, store and throw away your medicines at www.disposemymeds.org.          This list is accurate as of 2/23/18 11:58 AM.  Always use your most recent med list.                   Brand Name Dispense Instructions for use Diagnosis    ALBUTEROL IN      2-4 puffs every 4 hours as needed        ALEVE PO      Take by mouth as needed for moderate pain        BENADRYL PO      Take 25 mg by mouth daily as needed PER PATIENT HAS NOT TAKEN THIS YEAR DUE TO ALLERGY BEING UNDER CONTROL        BENZONATATE PO      Take by mouth every 4 hours as needed for cough        IBUPROFEN PO      Take 2-4 tablets by mouth as needed for moderate pain

## 2018-02-23 NOTE — NURSING NOTE
"Chief Complaint   Patient presents with     Urinary Problem       Initial /81 (BP Location: Left arm, Patient Position: Sitting, Cuff Size: Adult Large)  Pulse 106  Temp 98.4  F (36.9  C) (Oral)  Resp 16  Ht 5' 6.75\" (1.695 m)  Wt 203 lb (92.1 kg)  BMI 32.03 kg/m2 Estimated body mass index is 32.03 kg/(m^2) as calculated from the following:    Height as of this encounter: 5' 6.75\" (1.695 m).    Weight as of this encounter: 203 lb (92.1 kg).  Medication Reconciliation: complete    "

## 2018-02-23 NOTE — MR AVS SNAPSHOT
After Visit Summary   2/23/2018    Fernando Mcdonald    MRN: [id number]           Patient Information     Date Of Birth          2000        Visit Information        Provider Department      2/23/2018 8:20 AM Hema Magana, PT [facility] [place] [facility]        Today's Diagnoses     Chronic low back pain with bilateral sciatica, unspecified back pain laterality           Follow-ups after your visit        Your next 10 appointments already scheduled     Feb 23, 2018 11:00 AM CST   SHORT with Willi Tejada MD   [facility] [place] ([facility])    [address] [facility] [address]   [phone]            Mar 02, 2018  2:10 PM CST   JEOVANNY Spine with Hema Magana PT   [facility] [place] [facility] ([facility] [place])    [address] [facility] [address]  [address]  [place] [address]   [phone]            Apr 13, 2018  2:30 PM CDT   Return Visit with Jan Vyas MD   [facility] ([facility])    [address] [place] [id number] [phone]              Who to contact     If you have questions or need follow up information about today's clinic visit or your schedule please contact [facility] [place] [facility] directly at [phone].  Normal or non-critical lab and imaging results will be communicated to you by Addeparhart, letter or phone within 4 business days after the clinic has received the results. If you do not hear from us within 7 days, please contact the clinic through Addeparhart or phone. If you have a critical or abnormal lab result, we will notify you by phone as soon as possible.  Submit refill requests through Azuki Systems or call your pharmacy and they will forward the refill request to us. Please allow 3 business days for your refill to be completed.          Additional Information About Your Visit        AddeparharBuilding Robotics Information     Azuki Systems lets you send messages to  "your doctor, view your test results, renew your prescriptions, schedule appointments and more. To sign up, go to [email or web address] . Click on \"Log in\" on the left side of the screen, which will take you to the Welcome page. Then click on \"Sign up Now\" on the right side of the page.     You will be asked to enter the access code listed below, as well as some personal information. Please follow the directions to create your username and password.     Your access code is: [email or web address]  Expires: 5/10/2018  2:52 PM     Your access code will  in 90 days. If you need help or a new code, please call your [place] clinic or [phone].        Care EveryWhere ID     This is your Care EveryWhere ID. This could be used by other organizations to access your [place] medical records  [id number]         Blood Pressure from Last 3 Encounters:   18 133/79   17 130/76   17 132/76    Weight from Last 3 Encounters:   18 92.1 kg (203 lb) (94 %)*   17 88 kg (194 lb) (92 %)*   17 88.1 kg (194 lb 3.2 oz) (92 %)*     * Growth percentiles are based on [facility] 2-20 Years data.              We Performed the Following     NEUROMUSCULAR RE-EDUCATION     THERAPEUTIC EXERCISES        Primary Care Provider Office Phone # Fax #    [facility] [phone] [address] [facility] [address]        Equal Access to Services     NICOLE BROWN : Hadii shyam ku hadasho Soalina, waaxda luqadaha, qaybta kaalmada adeegyaluisa, marianna gallegos. So [facility] [phone].    ATENCIÓN: Si habla español, tiene a jacobson disposición servicios gratuitos de asistencia lingüística. Llame al [phone].    We comply with applicable federal civil rights laws and [place] laws. We do not discriminate on the basis of race, color, national origin, age, disability, sex, sexual orientation, or gender identity.            Thank you!     Thank you for choosing [facility]  for your care. Our goal is always to provide you with excellent care. Hearing back from our patients is one way we can continue to improve our services. Please take a few minutes to complete the written survey that you may receive in the mail after your visit with us. Thank you!             Your Updated Medication List - Protect others around you: Learn how to safely use, store and throw away your medicines at www.disposemymeds.org.          This list is accurate as of 2/23/18  9:01 AM.  Always use your most recent med list.                   Brand Name Dispense Instructions for use Diagnosis    ALBUTEROL IN      2-4 puffs every 4 hours as needed        ALEVE PO      Take by mouth as needed for moderate pain        BENADRYL PO      Take 25 mg by mouth daily as needed PER PATIENT HAS NOT TAKEN THIS YEAR DUE TO ALLERGY BEING UNDER CONTROL        BENZONATATE PO      Take by mouth every 4 hours as needed for cough        IBUPROFEN PO      Take 2-4 tablets by mouth as needed for moderate pain

## 2018-02-23 NOTE — PROGRESS NOTES
"  SUBJECTIVE:   Fernando Mcdonald is a 17 year old male who presents to clinic today for the following health issues:    Chief Complaint   Patient presents with     Urinary Problem       Genitourinary - Male  Onset: 1 year    Description:   Dysuria (painful urination): no   Hematuria (blood in urine): no   Frequency: YES  Are you urinating at night : no   Hesitancy (delay in urine): YES  Retention (unable to empty): YES  Decrease in urinary flow: no   Incontinence: YES    Progression of Symptoms:  waxing and waning    Accompanying Signs & Symptoms:  Fever: no   Back/Flank pain: YES- back pain  Urethral discharge: no   Testicle lumps/masses/pain: no   Nausea and/or vomiting: no   Abdominal pain: no     History:   History of frequent UTI's: no   History of kidney stones: no   History of hernias: YES- slight hernia??  Personal or Family history of Prostate problems: no  Sexually active: no     Precipitating factors:   Back pain that radiates into legs    Alleviating factors:  None known    1. Personal history of nicotine dependence    2. Polyuria    3. Polydipsia        PMH: Updated and/or reviewed in chart.    PSH: Updated and/or reviewed in chart.    Family History: Updated and/or reviewed in chart.     ROS:  Constitutional, HEENT, cardiovascular, pulmonary, gi and gu systems are otherwise negative.    OBJECTIVE:                                                    /81 (BP Location: Left arm, Patient Position: Sitting, Cuff Size: Adult Large)  Pulse 106  Temp 98.4  F (36.9  C) (Oral)  Resp 16  Ht 5' 6.75\" (1.695 m)  Wt 203 lb (92.1 kg)  BMI 32.03 kg/m2    GEN: No acute distress, smells strongly of stale cannabis smoke   EYES: No conjunctival injection or icterus, EOMI grossly intact  RESP: Unlabored, regular  NEURO: Normal gait, MAEx4, light touch sensation grossly intact  PSYCH: Normal mood and affect      Results for orders placed or performed in visit on 02/23/18   *UA reflex to Microscopic and Culture ([facility] " and [facility] (except [place] and [place])   Result Value Ref [facility]    Color Urine Yellow     Appearance Urine Clear     Glucose Urine Negative NEG^Negative mg/dL    Bilirubin Urine Small (A) NEG^Negative    Ketones Urine Trace (A) NEG^Negative mg/dL    Specific Gravity Urine 1.025 1.003 - 1.035    Blood Urine Negative NEG^Negative    pH Urine 5.5 5.0 - 7.0 pH    Protein Albumin Urine Negative NEG^Negative mg/dL    Urobilinogen Urine 1.0 0.2 - 1.0 EU/dL    Nitrite Urine Negative NEG^Negative    Leukocyte Esterase Urine Negative NEG^Negative    Source Midstream Urine    Hemoglobin A1c   Result Value Ref [facility]    Hemoglobin A1C 5.5 4.3 - 6.0 %      ASSESSMENT/PLAN:                                                    1. Personal history of nicotine dependence  Discussed importance of smoking cessation for 6 minutes, emphasizing risks of continued smoking -- including hypertension and elevated cardiovascular event risks, lung damage, and various cancers -- and the options available for smoking cessation, including risks, benefits, and alternatives of the various nicotine supplementation methods as well as bupropion and varenicline.  Patient deferred all nicotine supplementation offers.  - SMOKING CESSATION COUNSELING 3-10 MIN    2. Polyuria  3. Polydipsia  By history stronger need to rule-out diabetes.  DDx includes neurogenic bladder control issues, psychogenic polydipsia, infection, stone, other obstruction, anxiety.  Hemoglobin A1c and urinalysis unremarkable.  Possibility of prostate infection / STD not closely considered given yearlong chronic nature of symptoms.  Informed patient of negative results and asked him to track symptoms prior to follow-up to help determine path of diagnostic testing.  - *UA reflex to Microscopic and Culture ([place] and [facility] (except [facility])  - Hemoglobin hemoglobin A1c    5. Anxiety  Patient open to returning to therapy rather than medication --  appropriate.  - MENTAL HEALTH REFERRAL  - Adult; Assessments and Testing; General Psychological Testing; G: [facility] [phone]; We will contact you to schedule the appointment or please call with any questions       Orders Placed This Encounter     SMOKING CESSATION COUNSELING 3-10 MIN     *UA reflex to Microscopic and Culture ([place] and [facility] (except [place] and [place])     Hemoglobin A1c              Willi Tejada MD

## 2018-02-23 NOTE — LETTER
February 26, 2018      Fernando Mcdonald  [address] [place] [address]   [facility] [address]        Fernando,     Your urinalysis was largely within normal limits and your hemoglobin A1c, the test for diabetes, was normal.  So, your thirst and urinary frequency are not clearly related to blood sugars, kidney stone, or urinary infection.  There is more diagnostic evaluation that can be done, but try to monitor your fluid intake and urine output over the next few weeks to see if there are any identifiable triggers or patterns throughout the day with meals, activity, and so on.  Additional history can help us determine what, if any, testing should be done next.     Resulted Orders   *UA reflex to Microscopic and Culture ([place] and [facility] (except [place] and [place])   Result Value Ref [facility]    Color Urine Yellow     Appearance Urine Clear     Glucose Urine Negative NEG^Negative mg/dL    Bilirubin Urine Small (A) NEG^Negative      Comment:      This is an unconfirmed screening test result. A positive result may be false.    Ketones Urine Trace (A) NEG^Negative mg/dL    Specific Gravity Urine 1.025 1.003 - 1.035    Blood Urine Negative NEG^Negative    pH Urine 5.5 5.0 - 7.0 pH    Protein Albumin Urine Negative NEG^Negative mg/dL    Urobilinogen Urine 1.0 0.2 - 1.0 EU/dL    Nitrite Urine Negative NEG^Negative    Leukocyte Esterase Urine Negative NEG^Negative    Source Midstream Urine    Hemoglobin A1c   Result Value Ref [facility]    Hemoglobin A1C 5.5 4.3 - 6.0 %     If you have any questions or concerns, please call the clinic at the number listed above.     Sincerely,    Willi Tejada MD/joselin

## 2018-02-24 ASSESSMENT — PATIENT HEALTH QUESTIONNAIRE - PHQ9: SUM OF ALL RESPONSES TO PHQ QUESTIONS 1-9: 16

## 2018-02-24 ASSESSMENT — ANXIETY QUESTIONNAIRES: GAD7 TOTAL SCORE: 14

## 2018-03-02 ENCOUNTER — THERAPY VISIT (OUTPATIENT)
Dept: PHYSICAL THERAPY | Facility: CLINIC | Age: 18
End: 2018-03-02
Payer: COMMERCIAL

## 2018-03-02 DIAGNOSIS — M54.42 CHRONIC LOW BACK PAIN WITH BILATERAL SCIATICA, UNSPECIFIED BACK PAIN LATERALITY: ICD-10-CM

## 2018-03-02 DIAGNOSIS — M54.41 CHRONIC LOW BACK PAIN WITH BILATERAL SCIATICA, UNSPECIFIED BACK PAIN LATERALITY: ICD-10-CM

## 2018-03-02 DIAGNOSIS — G89.29 CHRONIC LOW BACK PAIN WITH BILATERAL SCIATICA, UNSPECIFIED BACK PAIN LATERALITY: ICD-10-CM

## 2018-03-02 PROCEDURE — 97530 THERAPEUTIC ACTIVITIES: CPT | Mod: GP | Performed by: PHYSICAL THERAPIST

## 2018-03-02 PROCEDURE — 97112 NEUROMUSCULAR REEDUCATION: CPT | Mod: GP | Performed by: PHYSICAL THERAPIST

## 2018-03-02 PROCEDURE — 97110 THERAPEUTIC EXERCISES: CPT | Mod: GP | Performed by: PHYSICAL THERAPIST

## 2018-03-02 NOTE — PROGRESS NOTES
Subjective:  HPI                    Objective:  System    Physical Exam    General     ROS    Assessment/Plan:    DISCHARGE REPORT    Progress reporting period is from 2/16/2018 to 3/2/2018.       SUBJECTIVE  Subjective: Still having spasms/pain in lower extremities, shooting pain below knee. Has difficulty standing full day through shift at work    Current Pain level: No change.     Initial Pain level: 8/10.   Changes in function:  None  Adverse reaction to treatment or activity: None    OBJECTIVE  Objective: High tone/involuntary twitching throughout PROM of lower extremities in supine. Unable to extend hips and/or lumbar spine past neutral in standing. Antalgic gait with visible limping. All lumbar active ROM increased bilateral leg pain to feet.     ASSESSMENT/PLAN  Updated problem list and treatment plan: Diagnosis 1:  Low Back Pain   STG/LTGs have been met or progress has been made towards goals:  None  Assessment of Progress: The patient's condition is unchanged.  Self Management Plans:  Patient has been instructed in a home treatment program.  Fernando continues to require the following intervention to meet STG and LTG's:  PT intervention is no longer required to meet STG/LTG.    Recommendations:  This patient would benefit from further evaluation. Discharge from PT, recommend follow up with neurologist due to lack of ability to participate in PT secondary to pain.    Please refer to the daily flowsheet for treatment today, total treatment time and time spent performing 1:1 timed codes.

## 2018-03-02 NOTE — Clinical Note
Dr. Vyas,  We've completed 3 sessions of PT and Fernando feels worse.  Every position and exercise we try increases his pain down both legs.  He is also having a lot of the involuntary twitching.  BEcause he is worsening and unable to participate fully in PT I asked him to consider scheduling sooner to see you if possible rather than wait until mid April.  Thanks  Hema Magana, PT

## 2018-03-02 NOTE — MR AVS SNAPSHOT
After Visit Summary   3/2/2018    Fernando Mcdonald    MRN: [id number]           Patient Information     Date Of Birth          2000        Visit Information        Provider Department      3/2/2018 2:10 PM Hema Magana PT [facility]        Today's Diagnoses     Chronic low back pain with bilateral sciatica, unspecified back pain laterality           Follow-ups after your visit        Your next 10 appointments already scheduled     Apr 13, 2018  2:30 PM CDT   [facility] with Jan [facility] MD   [facility]    [address] [place] [address]   [phone]            Jun 05, 2018 12:00 PM CDT   (Arrive by 11:30 AM)   New Visit with Venita Craig LP   [facility]    [address]  [facility] [address] [phone]            Jun 12, 2018  2:00 PM CDT   Return Visit with Venita Craig LP   [facility] ([facility])    [address] [place] [address]  [facility] [address] [phone]              Who to contact     If you have questions or need follow up information about today's clinic visit or your schedule please contact [facility] directly at [phone].  Normal or non-critical lab and imaging results will be communicated to you by Zervanthart, letter or phone within 4 business days after the clinic has received the results. If you do not hear from us within 7 days, please contact the clinic through Zervanthart or phone. If you have a critical or abnormal lab result, we will notify you by phone as soon as possible.  Submit refill requests through ChoicePass or call your pharmacy and they will forward the refill request to us. Please allow 3 business days for your refill to be completed.          Additional Information About Your Visit        ZervantharHardDrones Information     ChoicePass lets you send messages to your  "doctor, view your test results, renew your prescriptions, schedule appointments and more. To sign up, go to [email or web address] . Click on \"Log in\" on the left side of the screen, which will take you to the Welcome page. Then click on \"Sign up Now\" on the right side of the page.     You will be asked to enter the access code listed below, as well as some personal information. Please follow the directions to create your username and password.     Your access code is: [email or web address]  Expires: 5/10/2018  2:52 PM     Your access code will  in 90 days. If you need help or a new code, please call your [place] clinic or [phone].        Care EveryWhere ID     This is your Care EveryWhere ID. This could be used by other organizations to access your [place] medical records  [id number]         Blood Pressure from Last 3 Encounters:   18 126/81   18 133/79   17 130/76    Weight from Last 3 Encounters:   18 92.1 kg (203 lb) (94 %)*   18 92.1 kg (203 lb) (94 %)*   17 88 kg (194 lb) (92 %)*     * Growth percentiles are based on [facility] 2-20 Years data.              We Performed the Following     NEUROMUSCULAR RE-EDUCATION     THERAPEUTIC ACTIVITIES     THERAPEUTIC EXERCISES        Primary Care Provider Office Phone # Fax #    [facility] [phone] [address] [facility] [address]        Equal Access to Services     NICOLE BROWN : Hadii shyam ku hadasho Soomaali, waaxda luqadaha, qaybta kaalmada adeegyada, marianna gallegos. So [facility] [phone].    ATENCIÓN: Si habla español, tiene a jacobson disposición servicios gratuitos de asistencia lingüística. Llame al [phone].    We comply with applicable federal civil rights laws and [place] laws. We do not discriminate on the basis of race, color, national origin, age, disability, sex, sexual orientation, or gender identity.            Thank you!     Thank you for choosing " [facility]  for your care. Our goal is always to provide you with excellent care. Hearing back from our patients is one way we can continue to improve our services. Please take a few minutes to complete the written survey that you may receive in the mail after your visit with us. Thank you!             Your Updated Medication List - Protect others around you: Learn how to safely use, store and throw away your medicines at www.disposemymeds.org.          This list is accurate as of 3/2/18  5:16 PM.  Always use your most recent med list.                   Brand Name Dispense Instructions for use Diagnosis    ALBUTEROL IN      2-4 puffs every 4 hours as needed        ALEVE PO      Take by mouth as needed for moderate pain        BENADRYL PO      Take 25 mg by mouth daily as needed PER PATIENT HAS NOT TAKEN THIS YEAR DUE TO ALLERGY BEING UNDER CONTROL        BENZONATATE PO      Take by mouth every 4 hours as needed for cough        IBUPROFEN PO      Take 2-4 tablets by mouth as needed for moderate pain

## 2018-03-12 ENCOUNTER — TRANSFERRED RECORDS (OUTPATIENT)
Dept: HEALTH INFORMATION MANAGEMENT | Facility: CLINIC | Age: 18
End: 2018-03-12

## 2018-03-16 ENCOUNTER — OFFICE VISIT (OUTPATIENT)
Dept: FAMILY MEDICINE | Facility: CLINIC | Age: 18
End: 2018-03-16
Payer: COMMERCIAL

## 2018-03-16 VITALS
RESPIRATION RATE: 18 BRPM | BODY MASS INDEX: 31.88 KG/M2 | OXYGEN SATURATION: 97 % | SYSTOLIC BLOOD PRESSURE: 147 MMHG | HEART RATE: 88 BPM | WEIGHT: 202 LBS | DIASTOLIC BLOOD PRESSURE: 83 MMHG | TEMPERATURE: 98.5 F

## 2018-03-16 DIAGNOSIS — K21.9 GASTROESOPHAGEAL REFLUX DISEASE WITHOUT ESOPHAGITIS: ICD-10-CM

## 2018-03-16 DIAGNOSIS — F12.10 MARIJUANA ABUSE: ICD-10-CM

## 2018-03-16 DIAGNOSIS — R07.89 XIPHOIDALGIA: Primary | ICD-10-CM

## 2018-03-16 PROCEDURE — 99214 OFFICE O/P EST MOD 30 MIN: CPT | Performed by: FAMILY MEDICINE

## 2018-03-16 RX ORDER — MELOXICAM 7.5 MG/1
7.5 TABLET ORAL DAILY
Qty: 30 TABLET | Refills: 0 | Status: SHIPPED | OUTPATIENT
Start: 2018-03-16 | End: 2018-04-13

## 2018-03-16 NOTE — PROGRESS NOTES
SUBJECTIVE:   Fernando Mcdonald is a 17 year old male who presents to clinic today for the following health issues:      ED/UC Followup:    Facility:  [facility]  Date of visit: 3/12/2018  Reason for visit: xyphoidalgia  Current Status: better     ED Provider Note - Obdulio Capone MD - 03/12/2018 10:26 PM CDT  Formatting of this note may be different from the original.  Chief Complaint:  Abdominal Pain    History of Present Illness:  [facility]   Fernando Mcdonald is a 17 y.o. male with a past medical history significant for chronic low back with sciatica who presents to the emergency department for evaluation of abdominal/diaphragmatic pain. The patient states that he began having right upper quadrant abdominal pain, and noticed a mass in his diaphragm while lying down last evening. He describes this pain as a shooting pain and currently rates this at 7/10 in severity when at its worst. He states that he used marijuana for pain relief today with minimal resolution of his pain. The patient endorses an episode hemoptysis earlier today, cough and sputum production, but denies any fever, chest pain, shortness of breath, leg swelling, nausea, vomiting, diarrhea, constipation, numbness or tingling. No further complaints or concerns are voiced at this time.     Allergies:  Allergies   Allergen Reactions     Cefzil [Cefprozil] Itching   Itching in the throat     Medications:   The patient is currently on no regular medications.     Past Medical History:   Chronic low back pain  Anxiety  Systolic murmur  Inguinal hernia    Past Surgical History:   Pyloric valve stenosis repair    Family / Social History:   The patient has a family history of asthma, hypertension, thyroid disease. He is single and employed. The patient is a current every day smoker who smokes 0.50 ppd and does not report any alcohol consumption.     Review of Systems:  Review of Systems   Constitutional: Negative for fever.   Respiratory: Positive for cough,  "hemoptysis and sputum production. Negative for shortness of breath.   Cardiovascular: Negative for chest pain and leg swelling.   Gastrointestinal: Positive for abdominal pain. Negative for constipation, diarrhea, nausea and vomiting.   Neurological: Negative for tingling.   Negative for numbness.     Physical Exam:    First Vitals:  Temp: 98.5  F (36.9  C) BP: 174/81 Pulse: 96 Resp: 16 SpO2: 96 % Wt.: 92.1 kg (203 lb) Height: 167.6 cm (5' 6\")     Physical Exam   Nursing note and vitals reviewed.  Constitutional: Oriented to person, place, and time.   Cardiovascular: Normal rate, regular rhythm and normal heart sounds.  Pulmonary/Chest: Effort normal and breath sounds normal. No wheezes.  Abdominal: Soft. There is no tenderness.   Musculoskeletal: Tenderness over xyphoid. No mass or hernia. Normal range of motion. Exhibits no edema.    ECG:  Time Performed: 22:28  Time read: 22:30  Indication: abdominal pain, chest pain   Ventricular Rate: 86  QRS Duration: 94  R Axis: 55  Interpretation: Sinus rhythm with marked sinus arrhythmia  Otherwise normal ECG  No previous ECGs available    ED Course:  I reviewed nursing notes and vital signs. I examined the patient and discussed the initial plan of workup, including EKG.    The findings were discussed with the patient. The patient discharged home with discharge instructions. Reasons to return and the importance of close follow-up were reviewed.     Impression and Plan:  17-year-old male presents with pain at his xiphoid. Patient concerned he has a mass in this area. I do not feel any mass or hernia. Pain started last night and has been intermittent. EKG is unremarkable. Exam shows some tenderness over the distal xiphoid. No sign of infection or abscess lipoma or other mass. Patient reassured. He can be discharged to home Tylenol or ibuprofen and follow-up with his doctor as needed. Of note he goes to the clinic and ER for many different visits for different " problems.    Diagnosis:  ICD-10-CM   1. Xyphoidalgia R07.89           Update - He still has pain in the xyphoid. Pain varies depending on what he is doing. Pain is constant, mild to moderate, aggravated by lifting, bending, leaning and relieved with smoking weed. A/s with nausea. He also endorse chronic heartburn. He has previously tried Tums, which hasn't helped. Not relieved with Ibuprofen, muscle relaxant and tylenol. 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 He also takes weed due to chronic back pain.   He endorses chronic shortness of breath and fatigue. No coughing or leg swelling.   No abdominal pain, fever or chills.  No recent trauma.     Problem list and histories reviewed & adjusted, as indicated.  Additional history: as documented      Reviewed and updated as needed this visit by clinical staff       Reviewed and updated as needed this visit by Provider         ROS:  Constitutional, HEENT, cardiovascular, pulmonary, gi and gu systems are negative, except as otherwise noted.    OBJECTIVE:     /83  Pulse 88  Temp 98.5  F (36.9  C) (Oral)  Resp 18  Wt 202 lb (91.6 kg)  SpO2 97%  BMI 31.88 kg/m2  Body mass index is 31.88 kg/(m^2).  GENERAL: healthy, alert and no distress  EYES: Eyes grossly normal to inspection  HENT: nose and mouth without ulcers or lesions  RESP: lungs clear to auscultation - no rales, rhonchi or wheezes  CV: regular rate and rhythm, normal S1 S2  MS: + tenderness on the xiphoid   SKIN: no suspicious lesions or rashes    Diagnostic Test Results:  none     ASSESSMENT/PLAN:     1. Xiphoidalgia  - H/o marijuana abuse, avoid narcotics   - meloxicam (MOBIC) 7.5 MG tablet; Take 1 tablet (7.5 mg) by mouth daily  Dispense: 30 tablet; Refill: 0  - Mobic 7.5 mg daily, if not improving in 2 days then can increase to 15 mg daily  - Over the counter topical tiger balm or lidocaine patch, please follow package instructions  - No Advil, aleve or Aspirin while on the Mobic  -  Follow if symptoms worsen or fail to improve.    2. Gastroesophageal reflux disease without esophagitis  - discussed lifestyle modifications       Erika Nicholson MD  [facility]

## 2018-03-16 NOTE — PATIENT INSTRUCTIONS
Mobic 7.5 mg daily, if not improving in 2 days then can increase to 15 mg daily  Over the counter topical tiger balm or lidocaine patch, please follow package instructions  No Advil, aleve or Aspirin while on the Mobic  Follow if symptoms worsen or fail to improve.

## 2018-03-16 NOTE — MR AVS SNAPSHOT
After Visit Summary   3/16/2018    Fernando Mcdonald    MRN: [id number]           Patient Information     Date Of Birth          2000        Visit Information        Provider Department      3/16/2018 2:40 PM Erika Nicholson MD [facility]        Today's Diagnoses     Xiphoidalgia    -  1      Care Instructions    Mobic 7.5 mg daily, if not improving in 2 days then can increase to 15 mg daily  Over the counter topical tiger balm or lidocaine patch, please follow package instructions  No Advil, aleve or Aspirin while on the Mobic  Follow if symptoms worsen or fail to improve.          Follow-ups after your visit        Your next 10 appointments already scheduled     Mar 16, 2018  4:20 PM CDT   Office Visit with Shahzad Titus MD   [facility] ([facility]    [address] [facility] [address] [phone]           Bring a current list of meds and any records pertaining to this visit. For Physicals, please bring immunization records and any forms needing to be filled out. Please arrive 10 minutes early to complete paperwork.            Apr 13, 2018  2:30 PM CDT   Return Visit with Jan Vyas MD   [facility]    [address] [place] [address] [phone]            Jun 05, 2018 12:00 PM CDT   (Arrive by 11:30 AM)   New Visit with Venita Craig LP   [facility] ([facility])    [address] [id number] [phone]            Jun 12, 2018  2:00 PM CDT   Return Visit with Venita Craig LP   [facility] ([facility])    [address] [address] [phone]              Who to contact     If you have questions or need follow up information about today's clinic visit or your schedule please contact [facility] directly at [phone].  Normal or non-critical lab  "and imaging results will be communicated to you by MyChart, letter or phone within 4 business days after the clinic has received the results. If you do not hear from us within 7 days, please contact the clinic through [employer] or phone. If you have a critical or abnormal lab result, we will notify you by phone as soon as possible.  Submit refill requests through [employer] or call your pharmacy and they will forward the refill request to us. Please allow 3 business days for your refill to be completed.          Additional Information About Your Visit        [employer] Information     [employer] lets you send messages to your doctor, view your test results, renew your prescriptions, schedule appointments and more. To sign up, go to [email or web address] . Click on \"Log in\" on the left side of the screen, which will take you to the Welcome page. Then click on \"Sign up Now\" on the right side of the page.     You will be asked to enter the access code listed below, as well as some personal information. Please follow the directions to create your username and password.     Your access code is: [email or web address]  Expires: 5/10/2018  3:52 PM     Your access code will  in 90 days. If you need help or a new code, please call your [place] clinic or [phone].        Care EveryWhere ID     This is your Care EveryWhere ID. This could be used by other organizations to access your [place] medical records  [id number]        Your Vitals Were     Pulse Temperature Respirations Pulse Oximetry BMI (Body Mass Index)       88 98.5  F (36.9  C) (Oral) 18 97% 31.88 kg/m2        Blood Pressure from Last 3 Encounters:   18 147/83   18 126/81   18 133/79    Weight from Last 3 Encounters:   18 202 lb (91.6 kg) (93 %)*   18 203 lb (92.1 kg) (94 %)*   18 203 lb (92.1 kg) (94 %)*     * Growth percentiles are based on CDC 2-20 Years data.              Today, you had the following     No orders found for display "         Today's Medication Changes          These changes are accurate as of 3/16/18  3:27 PM.  If you have any questions, ask your nurse or doctor.               Start taking these medicines.        Dose/Directions    meloxicam 7.5 MG tablet   Commonly known as:  MOBIC   Used for:  Xiphoidalgia   Started by:  Erika Nicholson MD        Dose:  7.5 mg   Take 1 tablet (7.5 mg) by mouth daily   Quantity:  30 tablet   Refills:  0            Where to get your medicines      These medications were sent to [facility] [address]  [address], [facility] [address]     Phone:  [phone]     meloxicam 7.5 MG tablet                Primary Care Provider Office Phone # Fax #    [facility] [phone] [address] [facility] [address]        Equal Access to Services     [facility]: Hadii shyam ku hadasho Soomaali, waaxda luqadaha, qaybta kaalmada adeegyada, marianna murray hayaysha ingram . So [facility] [phone].    ATENCIÓN: Si habla español, tiene a jacobson disposición servicios gratuitos de asistencia lingüística. Llame al [phone].    We comply with applicable federal civil rights laws and [place] laws. We do not discriminate on the basis of race, color, national origin, age, disability, sex, sexual orientation, or gender identity.            Thank you!     Thank you for choosing [facility]  for your care. Our goal is always to provide you with excellent care. Hearing back from our patients is one way we can continue to improve our services. Please take a few minutes to complete the written survey that you may receive in the mail after your visit with us. Thank you!             Your Updated Medication List - Protect others around you: Learn how to safely use, store and throw away your medicines at www.disposemymeds.org.          This list is accurate as of 3/16/18  3:27 PM.  Always use your most recent med list.                    Brand Name Dispense Instructions for use Diagnosis    ALBUTEROL IN      2-4 puffs every 4 hours as needed        ALEVE PO      Take by mouth as needed for moderate pain        BENADRYL PO      Take 25 mg by mouth daily as needed PER PATIENT HAS NOT TAKEN THIS YEAR DUE TO ALLERGY BEING UNDER CONTROL        BENZONATATE PO      Take by mouth every 4 hours as needed for cough        IBUPROFEN PO      Take 2-4 tablets by mouth as needed for moderate pain        meloxicam 7.5 MG tablet    MOBIC    30 tablet    Take 1 tablet (7.5 mg) by mouth daily    Xiphoidalgia

## 2018-03-20 ENCOUNTER — TRANSFERRED RECORDS (OUTPATIENT)
Dept: HEALTH INFORMATION MANAGEMENT | Facility: CLINIC | Age: 18
End: 2018-03-20

## 2018-04-13 ENCOUNTER — OFFICE VISIT (OUTPATIENT)
Dept: NEUROLOGY | Facility: CLINIC | Age: 18
End: 2018-04-13
Payer: COMMERCIAL

## 2018-04-13 VITALS
WEIGHT: 202 LBS | SYSTOLIC BLOOD PRESSURE: 140 MMHG | OXYGEN SATURATION: 97 % | DIASTOLIC BLOOD PRESSURE: 96 MMHG | HEART RATE: 78 BPM | BODY MASS INDEX: 31.88 KG/M2

## 2018-04-13 DIAGNOSIS — M54.50 CHRONIC MIDLINE LOW BACK PAIN WITHOUT SCIATICA: Primary | ICD-10-CM

## 2018-04-13 DIAGNOSIS — G89.29 CHRONIC MIDLINE LOW BACK PAIN WITHOUT SCIATICA: Primary | ICD-10-CM

## 2018-04-13 PROCEDURE — 99213 OFFICE O/P EST LOW 20 MIN: CPT | Performed by: PSYCHIATRY & NEUROLOGY

## 2018-04-13 ASSESSMENT — PAIN SCALES - GENERAL: PAINLEVEL: EXTREME PAIN (9)

## 2018-04-13 NOTE — PATIENT INSTRUCTIONS
Recommendations:  #1 Referral to pain clinic for comprehensive pain management   #2  Reassured concerning functional movement disorder complaints  #3  At this time we have nothing further to offer this patient neurologically.  He can return as needed

## 2018-04-13 NOTE — LETTER
4/13/2018         RE: Fernando Mcdonald  [address] [place], [place] [address] [address]  [facility] [address]        Dear Colleague,    Thank you for referring your patient, Fernando Mcdonald, to the [facility]. Please see a copy of my visit note below.    Movement Disorder Clinic follow up note    Patient: Fernando Mcdonald  Medical Record Number: [id number]  Encounter Date: April 13, 2018  PCP:Kyler Nagy    CC: Chronic low back pain    Impression:  #1 Chronic mechanical low back pain    #2  Functional movement disorder much improved    Recommendations:  #1 Referral to pain clinic for comprehensive pain management   #2  Reassured concerning functional movement disorder complaints  #3  At this time we have nothing further to offer this patient neurologically.  He can return as needed.    Return to clinic: As needed    Interval Hx:: Mr. Fernando Mcdonald reports that he Has had no relief in his chronic unrelenting low back pain.he spoke points to a spot around the L1 vertebrae.  This is a severe pain that is deep in which he rates at 8 out of 10.  This pain has some mild radiation to the buttocks but no radicular radiation.  He says he does have numbness in his legs.  He says his legs are weak.  The movement disorder that was associated with pain has improved.    He went through the physical therapy program and says it gave him no relief.    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  He asked about medical marijuana.  I informed him that I am not a certify her for marijuana.    I performed a neurological exam on him.  He has no motor weakness in his legs.  His reflexes are normal.  He has nonneurological distribution sensory loss throughout the legs and up and patchy areas on his trunk.  His gait is normal.  Current medications  Current Outpatient Prescriptions   Medication     BENZONATATE PO     IBUPROFEN PO     Naproxen Sodium (ALEVE PO)     ALBUTEROL IN     DiphenhydrAMINE HCl (BENADRYL PO)     No  current facility-administered medications for this visit.        Examination:  BP (!) 140/96  Pulse 78  Wt 91.6 kg (202 lb)  SpO2 97%  BMI 31.88 kg/m2  General- no distress, no rash, good pulses, no edema  Respiratory-auscultation over the anterior lung fields is clear  Cardiac- regular rate and rhythm    20 minutes spent with patient over 50% of which was counseling    Again, thank you for allowing me to participate in the care of your patient.        Sincerely,        Jan Vyas MD

## 2018-04-13 NOTE — NURSING NOTE
"Fernando Mcdonald's goals for this visit include: return  He requests these members of his care team be copied on today's visit information:     PCP: Kyler Nagy    Referring Provider:  No referring provider defined for this encounter.    Chief Complaint   Patient presents with     RECHECK       Initial BP (!) 140/96  Pulse 78  Wt 91.6 kg (202 lb)  SpO2 97%  BMI 31.88 kg/m2 Estimated body mass index is 31.88 kg/(m^2) as calculated from the following:    Height as of 2/23/18: 1.695 m (5' 6.75\").    Weight as of this encounter: 91.6 kg (202 lb).  Medication Reconciliation: complete    Do you need any medication refills at today's visit? ?  "

## 2018-04-13 NOTE — PROGRESS NOTES
Movement Disorder Clinic follow up note    Patient: Fernando Mcdonald  Medical Record Number: [id number]  Encounter Date: April 13, 2018  PCP:Kyler Nagy    CC: Chronic low back pain    Impression:  #1 Chronic mechanical low back pain    #2  Functional movement disorder much improved    Recommendations:  #1 Referral to pain clinic for comprehensive pain management   #2  Reassured concerning functional movement disorder complaints  #3  At this time we have nothing further to offer this patient neurologically.  He can return as needed.    Return to clinic: As needed    Interval Hx:: Mr. Fernando Mcdonald reports that he Has had no relief in his chronic unrelenting low back pain.he spoke points to a spot around the L1 vertebrae.  This is a severe pain that is deep in which he rates at 8 out of 10.  This pain has some mild radiation to the buttocks but no radicular radiation.  He says he does have numbness in his legs.  He says his legs are weak.  The movement disorder that was associated with pain has improved.    He went through the physical therapy program and says it gave him no relief.    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  He asked about medical marijuana.  I informed him that I am not a certify her for marijuana.    I performed a neurological exam on him.  He has no motor weakness in his legs.  His reflexes are normal.  He has nonneurological distribution sensory loss throughout the legs and up and patchy areas on his trunk.  His gait is normal.  Current medications  Current Outpatient Prescriptions   Medication     BENZONATATE PO     IBUPROFEN PO     Naproxen Sodium (ALEVE PO)     ALBUTEROL IN     DiphenhydrAMINE HCl (BENADRYL PO)     No current facility-administered medications for this visit.        Examination:  BP (!) 140/96  Pulse 78  Wt 91.6 kg (202 lb)  SpO2 97%  BMI 31.88 kg/m2  General- no distress, no rash, good pulses, no edema  Respiratory-auscultation over the anterior  lung fields is clear  Cardiac- regular rate and rhythm    20 minutes spent with patient over 50% of which was counseling

## 2018-04-13 NOTE — LETTER
Patient:  Fernando Mcdonald  :   2000  MRN:     [id number]      2018    Patient Name:  Fernando Mcdonald    Physician: Jan Vyas MD    hemkita return to work on     his next clinic appointment is scheduled for Visit date not found    Patient Limitations:    None    Dr. Jan Vyas                   [phone]  2000

## 2018-04-13 NOTE — MR AVS SNAPSHOT
After Visit Summary   4/13/2018    Fernando Mcdonald    MRN: [id number]           Patient Information     Date Of Birth          2000        Visit Information        Provider Department      4/13/2018 2:30 PM Jan Vyas MD [facility]        Today's Diagnoses     Chronic midline low back pain without sciatica    -  1       Follow-ups after your visit        Additional Services     [facility]       Please call [phone] to make an appointment. Clinic is located: [facility] [address] [place] [address] [id number] [place] [address]    Patient requests [place] location      Please complete the following questions:    Procedure/Referral: Referral Only -  Comprehensive Evaluation and Management    What is your diagnosis for the patient's pain? Chronic low back pain    What are your specific questions for the pain specialist? Pain management    Are there any red flags that may impact the assessment or management of the patient? None                  Follow-up notes from your care team     Return if symptoms worsen or fail to improve.      Your next 10 appointments already scheduled     Apr 17, 2018  8:40 AM CDT   (Arrive by 8:25 AM)   New Patient Visit with GAUDENCIO Xiong CNP   [facility] ([facility])    [address] [place] [address]  4th [facility] [address] [phone]            Jun 05, 2018 12:00 PM CDT   (Arrive by 11:30 AM)   New Visit with Venita Craig LP   [facility] [place] ([facility] [place])    [address]  [place] [address]   [phone]            Jun 12, 2018  2:00 PM CDT   Return Visit with Venita Craig LP   [facility] [place] ([facility] [place])    [address]  [place] [address]   [phone]              Future tests that were ordered for you today     Open Future Orders        Priority  Expected Expires Ordered    [facility] Routine  2019            Who to contact     If you have questions or need follow up information about today's clinic visit or your schedule please contact [facility] directly at [phone].  Normal or non-critical lab and imaging results will be communicated to you by MyChart, letter or phone within 4 business days after the clinic has received the results. If you do not hear from us within 7 days, please contact the clinic through MyChart or phone. If you have a critical or abnormal lab result, we will notify you by phone as soon as possible.  Submit refill requests through [employer] or call your pharmacy and they will forward the refill request to us. Please allow 3 business days for your refill to be completed.          Additional Information About Your Visit        O2 Secure Wireless[employer]Now[employer] News Information     [employer] is an electronic gateway that provides easy, online access to your medical records. With [employer], you can request a clinic appointment, read your test results, renew a prescription or communicate with your care team.     To sign up for [employer] visit the website at [email or web address]   You will be asked to enter the access code listed below, as well as some personal information. Please follow the directions to create your username and password.     Your access code is: [email or web address]  Expires: 5/10/2018  3:52 PM     Your access code will  in 90 days. If you need help or a new code, please contact your [facility] or call [phone] for assistance.        Care EveryWhere ID     This is your Care EveryWhere ID. This could be used by other organizations to access your [facility] medical records  [id number]        Your Vitals Were     Pulse Pulse Oximetry BMI (Body Mass Index)             78 97% 31.88 kg/m2          Blood Pressure from Last 3 Encounters:   18 (!) 140/96    03/16/18 147/83   02/23/18 126/81    Weight from Last 3 Encounters:   04/13/18 91.6 kg (202 lb) (93 %)*   03/16/18 91.6 kg (202 lb) (93 %)*   02/23/18 92.1 kg (203 lb) (94 %)*     * Growth percentiles are based on [facility] 2-20 Years data.               Primary Care Provider Office Phone # Fax #    [facility] [phone] [address] [facility] [address]        Equal Access to Services     [facility]: Hadii aad ku hadasho Soomaali, waaxda luqadaha, qaybta kaalmada adeegyada, marianna ingram . So [facility] [phone].    ATENCIÓN: Si habla español, tiene a jacobson disposición servicios gratuitos de asistencia lingüística. [facility] [phone].    We comply with applicable federal civil rights laws and [place] laws. We do not discriminate on the basis of race, color, national origin, age, disability, sex, sexual orientation, or gender identity.            Thank you!     Thank you for choosing [facility]  for your care. Our goal is always to provide you with excellent care. Hearing back from our patients is one way we can continue to improve our services. Please take a few minutes to complete the written survey that you may receive in the mail after your visit with us. Thank you!             Your Updated Medication List - Protect others around you: Learn how to safely use, store and throw away your medicines at www.disposemymeds.org.          This list is accurate as of 4/13/18  3:20 PM.  Always use your most recent med list.                   Brand Name Dispense Instructions for use Diagnosis    ALBUTEROL IN      2-4 puffs every 4 hours as needed        ALEVE PO      Take by mouth as needed for moderate pain        BENADRYL PO      Take 25 mg by mouth daily as needed PER PATIENT HAS NOT TAKEN THIS YEAR DUE TO ALLERGY BEING UNDER CONTROL        BENZONATATE PO      Take by mouth every 4 hours as needed for cough        IBUPROFEN PO      Take  2-4 tablets by mouth as needed for moderate pain

## 2018-04-17 ENCOUNTER — OFFICE VISIT (OUTPATIENT)
Dept: ANESTHESIOLOGY | Facility: CLINIC | Age: 18
End: 2018-04-17
Payer: COMMERCIAL

## 2018-04-17 VITALS
SYSTOLIC BLOOD PRESSURE: 140 MMHG | BODY MASS INDEX: 31.71 KG/M2 | HEART RATE: 61 BPM | RESPIRATION RATE: 16 BRPM | DIASTOLIC BLOOD PRESSURE: 88 MMHG | HEIGHT: 67 IN | WEIGHT: 202 LBS

## 2018-04-17 DIAGNOSIS — M54.50 CHRONIC MIDLINE LOW BACK PAIN WITHOUT SCIATICA: ICD-10-CM

## 2018-04-17 DIAGNOSIS — G89.29 CHRONIC MIDLINE LOW BACK PAIN WITHOUT SCIATICA: ICD-10-CM

## 2018-04-17 DIAGNOSIS — M79.18 MYOFASCIAL PAIN: Primary | ICD-10-CM

## 2018-04-17 RX ORDER — CAPSAICIN 0.025 %
1 CREAM (GRAM) TOPICAL 4 TIMES DAILY PRN
Qty: 60 G | Refills: 0 | Status: SHIPPED | OUTPATIENT
Start: 2018-04-17 | End: 2018-11-06

## 2018-04-17 ASSESSMENT — ENCOUNTER SYMPTOMS
DIARRHEA: 1
FEVER: 0
HEMATURIA: 0
NUMBNESS: 1
TINGLING: 1
FATIGUE: 1
CHILLS: 1
SNORES LOUDLY: 1
NAUSEA: 1
POLYPHAGIA: 0
DIFFICULTY URINATING: 1
MYALGIAS: 1
DIZZINESS: 1
POLYDIPSIA: 1
HYPERTENSION: 1
NERVOUS/ANXIOUS: 1
ABDOMINAL PAIN: 1
BACK PAIN: 1
DECREASED CONCENTRATION: 1
DYSURIA: 0
MUSCLE WEAKNESS: 1
VOMITING: 1
HEARTBURN: 1
DEPRESSION: 1
NIGHT SWEATS: 0
DECREASED APPETITE: 1

## 2018-04-17 ASSESSMENT — PATIENT HEALTH QUESTIONNAIRE - PHQ9
10. IF YOU CHECKED OFF ANY PROBLEMS, HOW DIFFICULT HAVE THESE PROBLEMS MADE IT FOR YOU TO DO YOUR WORK, TAKE CARE OF THINGS AT HOME, OR GET ALONG WITH OTHER PEOPLE: SOMEWHAT DIFFICULT
SUM OF ALL RESPONSES TO PHQ QUESTIONS 1-9: 12
SUM OF ALL RESPONSES TO PHQ QUESTIONS 1-9: 12

## 2018-04-17 ASSESSMENT — PAIN SCALES - GENERAL: PAINLEVEL: EXTREME PAIN (8)

## 2018-04-17 ASSESSMENT — ANXIETY QUESTIONNAIRES
4. TROUBLE RELAXING: MORE THAN HALF THE DAYS
GAD7 TOTAL SCORE: 11
5. BEING SO RESTLESS THAT IT IS HARD TO SIT STILL: SEVERAL DAYS
GAD7 TOTAL SCORE: 11
1. FEELING NERVOUS, ANXIOUS, OR ON EDGE: MORE THAN HALF THE DAYS
7. FEELING AFRAID AS IF SOMETHING AWFUL MIGHT HAPPEN: MORE THAN HALF THE DAYS
6. BECOMING EASILY ANNOYED OR IRRITABLE: SEVERAL DAYS
2. NOT BEING ABLE TO STOP OR CONTROL WORRYING: SEVERAL DAYS
GAD7 TOTAL SCORE: 11
7. FEELING AFRAID AS IF SOMETHING AWFUL MIGHT HAPPEN: MORE THAN HALF THE DAYS
3. WORRYING TOO MUCH ABOUT DIFFERENT THINGS: MORE THAN HALF THE DAYS

## 2018-04-17 NOTE — MR AVS SNAPSHOT
After Visit Summary   4/17/2018    Fernando Mcdonald    MRN: [id number]           Patient Information     Date Of Birth          2000        Visit Information        Provider Department      4/17/2018 8:40 AM Eugenia Decker APRN [facility]        Today's Diagnoses     Chronic midline low back pain without sciatica          Care Instructions    1. Apply capsaicin cream to painful areas up to four times daily as needed.     Follow up: As needed       To speak with a nurse, schedule/reschedule/cancel a clinic appointment, or request a medication refill call: [phone]     You can also reach us by [employer]: [email or web address]    For refills, please call on Monday, 1 week before your medication runs out. The doctors are not always in clinic, so this gives us time to get your prescriptions ready.  Please let us know the name of the medication you are requesting a refill of.                                     Follow-ups after your visit        Your next 10 appointments already scheduled     Jun 05, 2018 12:00 PM CDT   (Arrive by 11:30 AM)   New Visit with Venita Craig LP   [facility] ([facility])    [address] [place] [address] [address]  [facility] [address]   [phone]            Jun 12, 2018  2:00 PM CDT   Return Visit with Venita Craig [facility] ([facility] [place])    [address] [place] [address] [address]  [facility] [address]   [phone]              Who to contact     Please call your clinic at [phone] to:    Ask questions about your health    Make or cancel appointments    Discuss your medicines    Learn about your test results    Speak to your doctor            Additional Information About Your Visit        [employer]an [employer] Rongroeurnghart Information     [employer] is an electronic gateway that provides easy, online access to your medical records. With [employer], you can request a clinic appointment, read your test  "results, renew a prescription or communicate with your care team.     To sign up for MyChart visit the website at [email or web address]   You will be asked to enter the access code listed below, as well as some personal information. Please follow the directions to create your username and password.     Your access code is: [email or web address]  Expires: 5/10/2018  3:52 PM     Your access code will  in 90 days. If you need help or a new code, please contact your [facility] or call [phone] for assistance.        Care EveryWhere ID     This is your Care EveryWhere ID. This could be used by other organizations to access your [place] medical records  [id number]        Your Vitals Were     Pulse Respirations Height BMI (Body Mass Index)          61 16 1.689 m (5' 6.5\") 32.12 kg/m2         Blood Pressure from Last 3 Encounters:   18 140/88   18 (!) 140/96   18 147/83    Weight from Last 3 Encounters:   18 91.6 kg (202 lb) (93 %)*   18 91.6 kg (202 lb) (93 %)*   18 91.6 kg (202 lb) (93 %)*     * Growth percentiles are based on [facility] 2-20 Years data.              We Performed the Following     [facility]        Primary Care Provider Office Phone # Fax #    [facility] [phone]       [address] [facility] [address]        Equal Access to Services     [facility] : Hadii shyam ku hadasho Soomaali, waaxda luqadaha, qaybta kaalmada adeegyada, marianna ingram . So [facility] [phone].    ATENCIÓN: Si habla español, tiene a jacobson disposición servicios gratuitos de asistencia lingüística. Llame al [phone].    We comply with applicable federal civil rights laws and [place] laws. We do not discriminate on the basis of race, color, national origin, age, disability, sex, sexual orientation, or gender identity.            Thank you!     Thank you for choosing [facility] " [facility]IC FOR [facility]  for your care. Our goal is always to provide you with excellent care. Hearing back from our patients is one way we can continue to improve our services. Please take a few minutes to complete the written survey that you may receive in the mail after your visit with us. Thank you!             Your Updated Medication List - Protect others around you: Learn how to safely use, store and throw away your medicines at www.disposemymeds.org.          This list is accurate as of 4/17/18  9:19 AM.  Always use your most recent med list.                   Brand Name Dispense Instructions for use Diagnosis    ALBUTEROL IN      2-4 puffs every 4 hours as needed        ALEVE PO      Take by mouth as needed for moderate pain        BENADRYL PO      Take 25 mg by mouth daily as needed PER PATIENT HAS NOT TAKEN THIS YEAR DUE TO ALLERGY BEING UNDER CONTROL        BENZONATATE PO      Take by mouth every 4 hours as needed for cough        IBUPROFEN PO      Take 2-4 tablets by mouth as needed for moderate pain

## 2018-04-17 NOTE — NURSING NOTE
AVS given and reviewed with pt.  Pt verbalized understanding and declined any questions.     Angella Hare, RN, BSN

## 2018-04-17 NOTE — LETTER
"4/17/2018       RE: Fernando Mcdonald  [address]  [place] [address]     Dear Colleague,    Thank you for referring your patient, Fernando Mcdonald, to the [facility] at [facility]. Please see a copy of my visit note below.    I had the pleasure of meeting Mr. Fernando Mcdonald on 4/17/2018 in the outpatient pain clinic in consult for Dr. Vyas with regards to his chronic mechanical low back pain.   HPI:  Patient presents with past medical history of functional movement disorder; he is here today for evaluation of chronic mechanical low back pain. He says he has had chronic low back pain for 5 years since the age of 12. The pain increased in in June 2017; he had an MRI of the lumbar spine which indicates a left central midline disc at L5-S1 with mild left foraminal stenosis without neural impingement. He describes his pain as a constant deep ache that waxes and wanes in severity; intermittent shooting pain to bilateral lower extremities equivalently. He is unable to describe the distribution of his lower extremity pain as it \"feels all over\". He endorses bilateral lower extremity weakness.   He was referred by Dr. Vyas in neurology who felt patient had no motor weakness in his legs, however, had noted distribution sensory loss throughout lower extremities. Patient was seen in the [facility] Disorder Clinic in neurology; pharmacological treatment was not advisable for movement disorder of legs - physical therapy was recommended which patient did find to be of benefit. Patient states he only completed three sessions, but was discharged due to lack of progress.   Patient unable to identify aggravating factors. He reports he smokes marijuana 6-7 times daily and occasionally uses LSD; both of these illicit drugs provide pain alleviation. He expresses interest today in medical marijuana. He reports he has tried his mother's Robaxin, " "\"nerve blocker\" medication, and her personal medical marijuana; he found all of these medications, with the exception of the medical marijuana, to be ineffective. He uses Icy Hot/lidocaine and TENS unit with short duration relief. Has followed with chiropractor for adjustments, massage, mediation, and herbals without relief. He notes he cannot tolerate having his low back touched. He is opposed to injections as his mother has had adverse effects with neuroaxial injections. He states he cannot complete aquatic physical therapy due to fear of drowning (he does admit that he has almost drowned but believes it was because he was using drugs).   He notes he will be establishing with a therapist in June for his depression and anxiety; denies suicidal ideation, but confirms a history of this.       ?  Past Medical History:   Diagnosis Date     Anxiety     Not taking medication. Saw psychiatrist in 2011/2012. No specific diagnosis and no specific treatment as per patient     Concussion 9/25/2012     Low back pain 2014    No back injury     Syncope 9/25/2012    past medical history reviewed with patient.   Past Surgical History:   Procedure Laterality Date     ABDOMEN SURGERY  1999    Pyloric stenosis as infant     past surgical history reviewed with patient.   Medications:  Current Outpatient Prescriptions   Medication     BENZONATATE PO     IBUPROFEN PO     Naproxen Sodium (ALEVE PO)     ALBUTEROL IN     DiphenhydrAMINE HCl (BENADRYL PO)     No current facility-administered medications for this visit.      A multi-state Prescription Monitoring Program reviewed and no aberrancies noted.     Allergies:     Allergies   Allergen Reactions     Cefzil [Cefprozil] Other (See Comments)     Swelling and itchiness of the thraot     Family History:  family history includes Asthma in his mother; Hypertension in his mother; Thyroid Disease in his mother. There is no history of Breast Cancer, Colon Cancer, or Prostate Cancer.  Social " "history: he lives in [place] with his mother. He is currently working at [employer].   Smoking: daily smoker. Alcohol: denies. Street drugs: marijuana daily, acid.     ROS:  A 14 point review of systems was completed; results are listed at end of note.     Objective:   /88  Pulse 61  Resp 16  Ht 1.689 m (5' 6.5\")  Wt 91.6 kg (202 lb)  BMI 32.12 kg/m2  Body mass index is 32.12 kg/(m^2).  General: In no apparent distress  Mental status: Normal mood and affect, pleasant  Neuro: Alert, oriented. No sensory deficits.   Head: Atraumatic, normocephalic  Eyes: Extra-ocular movements grossly intact, no scleral icterus  Neck: Supple, Range of motion full  Respiratory: Non-labored breathing; No respiratory distress  Skin: No rashes or lesions noted on exposed areas of skin  Msk:   Lumbar Spine:    Spinous process tenderness with palpation of L1- L5 vertebrae, at midline. Diffuse tenderness of buttocks, trochanteric bursa. Hyperesthesia to light touch of low back.   Able to complete heel/toe walk without difficulty.   Extension and flexion aggravate low back pain/   Strength 5/5 bilaterally: dorsiflexion, plantarflexion, hamstrings, quadriceps.  Patellar and Achilles reflexes 2+ bilaterally.   Positive straight leg raise bilaterally. Negative Fabers.   Gait is normal; symmetrical.    Imaging:   Findings:   5 lumbar-type vertebrae counting down from the presumed 12th ribs. The  tip the conus medullaris is at L1. The cauda equina nerve roots and  visualized thoracic cord are normal.     Normal alignment of the lumbar vertebrae. Normal lumbar lordosis. No  abnormal vertebral marrow edema. No evidence of spondylolysis.  Paraspinous soft tissues are normal. Disc degeneration at L5-S1 with  loss of disc height and disc desiccation.     On a level by level basis:     T12-L1: No spinal canal or neuroforaminal stenosis.     L1-2: No spinal canal or neuroforaminal stenosis.     L2-3: No spinal canal or neuroforaminal " stenosis.     L3-4: No spinal canal or neuroforaminal stenosis.     L4-5:  No spinal canal or neuroforaminal stenosis.     L5-S1: Disc bulge with superimposed left central protrusion. No  evidence of neural impingement. Bilateral facet arthropathy. Mild left  neural foraminal stenosis. No significant spinal canal stenosis.            Impression: Disc degeneration at L5-S1 with left central protrusion.  Mild left neural foraminal stenosis at L5-S1 without evidence of  neural impingement. No significant spinal canal stenosis.    Assessment:  Mr. Fernando Mcdonald is a 18 yo male seen today for chronic bilateral low back pain with subjective radiculopathy that does not correlate with imaging or physical exam. Differential diagnoses include lumbar disc protrusion L5-S1, myofascial pain, somatization.   Plan:   1. Patient education: I went over the above diagnoses and treatment plan with him and answered all of his questions.  2. Imaging review: I reviewed the lumbar MRI with him; I do not see evidence on his imaging study that correlates with his symptoms. He does have a mild disc protrusion that may contribute to some of his axial symptoms, however, I think this is unlikely. Regardless, patient is reluctant to consider any injections.   3. Interventions: None indicated at this time; continue with use of TENS unit.   4. Medications  1. Capsaicin 0.025% cream qid prn. I do not feel further pharmacological options would be beneficial to patient at this time.   -Discussed medical marijuana in depth with patient; I, nor is anyone in my practice, a certified provider. At this time, due to patients illicit substance use and reluctance in completing recommended therapies, I do not feel he is an appropriate candidate for medical marijuana. However, this determination is certainly at the discretion of a certifying provider.   5. Exercise program: We did discuss aquatic physical therapy; patient declined due to fear of water.    6. Behavioral Psychology: Establish with psychology for CBT and mindfulness training for chronic pain.   7. Follow up: RTC as needed.     Total time spent was 35 minutes, and more than 50% of face to face time was spent in counseling and/or coordination of care regarding the above plan.    Thank you for the consult.   GAUDENCIO Xiong, [facility]  [facility]

## 2018-04-17 NOTE — PROGRESS NOTES
"I had the pleasure of meeting Mr. Fernando Mcdonald on 4/17/2018 in the outpatient pain clinic in consult for Dr. Vyas with regards to his chronic mechanical low back pain.   HPI:  Patient presents with past medical history of functional movement disorder; he is here today for evaluation of chronic mechanical low back pain. He says he has had chronic low back pain for 5 years since the age of 12. The pain increased in in June 2017; he had an MRI of the lumbar spine which indicates a left central midline disc at L5-S1 with mild left foraminal stenosis without neural impingement. He describes his pain as a constant deep ache that waxes and wanes in severity; intermittent shooting pain to bilateral lower extremities equivalently. He is unable to describe the distribution of his lower extremity pain as it \"feels all over\". He endorses bilateral lower extremity weakness.   He was referred by Dr. Vyas in neurology who felt patient had no motor weakness in his legs, however, had noted distribution sensory loss throughout lower extremities. Patient was seen in the [facility] in neurology; pharmacological treatment was not advisable for movement disorder of legs - physical therapy was recommended which patient did find to be of benefit. Patient states he only completed three sessions, but was discharged due to lack of progress.   Patient unable to identify aggravating factors. He reports he smokes marijuana 6-7 times daily and occasionally uses LSD; both of these illicit drugs provide pain alleviation. He expresses interest today in medical marijuana. He reports he has tried his mother's Robaxin, \"nerve blocker\" medication, and her personal medical marijuana; he found all of these medications, with the exception of the medical marijuana, to be ineffective. He uses Icy Hot/lidocaine and TENS unit with short duration relief. Has followed with chiropractor for adjustments, massage, mediation, and herbals " without relief. He notes he cannot tolerate having his low back touched. He is opposed to injections as his mother has had adverse effects with neuroaxial injections. He states he cannot complete aquatic physical therapy due to fear of drowning (he does admit that he has almost drowned but believes it was because he was using drugs).   He notes he will be establishing with a therapist in June for his depression and anxiety; denies suicidal ideation, but confirms a history of this.       ?  Past Medical History:   Diagnosis Date     Anxiety     Not taking medication. Saw psychiatrist in 2011/2012. No specific diagnosis and no specific treatment as per patient     Concussion 9/25/2012     Low back pain 2014    No back injury     Syncope 9/25/2012    past medical history reviewed with patient.   Past Surgical History:   Procedure Laterality Date     ABDOMEN SURGERY  1999    Pyloric stenosis as infant     past surgical history reviewed with patient.   Medications:  Current Outpatient Prescriptions   Medication     BENZONATATE PO     IBUPROFEN PO     Naproxen Sodium (ALEVE PO)     ALBUTEROL IN     DiphenhydrAMINE HCl (BENADRYL PO)     No current facility-administered medications for this visit.      A multi-state Prescription Monitoring Program reviewed and no aberrancies noted.     Allergies:     Allergies   Allergen Reactions     Cefzil [Cefprozil] Other (See Comments)     Swelling and itchiness of the thraot     Family History:  family history includes Asthma in his mother; Hypertension in his mother; Thyroid Disease in his mother. There is no history of Breast Cancer, Colon Cancer, or Prostate Cancer.  Social history: he lives in [place] with his mother. He is currently working at [employer].   Smoking: daily smoker. Alcohol: denies. Street drugs: marijuana daily, acid.     ROS:  A 14 point review of systems was completed; results are listed at end of note.     Objective:   /88  Pulse 61  Resp 16  Ht  "1.689 m (5' 6.5\")  Wt 91.6 kg (202 lb)  BMI 32.12 kg/m2  Body mass index is 32.12 kg/(m^2).  General: In no apparent distress  Mental status: Normal mood and affect, pleasant  Neuro: Alert, oriented. No sensory deficits.   Head: Atraumatic, normocephalic  Eyes: Extra-ocular movements grossly intact, no scleral icterus  Neck: Supple, Range of motion full  Respiratory: Non-labored breathing; No respiratory distress  Skin: No rashes or lesions noted on exposed areas of skin  Msk:   Lumbar Spine:    Spinous process tenderness with palpation of L1- L5 vertebrae, at midline. Diffuse tenderness of buttocks, trochanteric bursa. Hyperesthesia to light touch of low back.   Able to complete heel/toe walk without difficulty.   Extension and flexion aggravate low back pain/   Strength 5/5 bilaterally: dorsiflexion, plantarflexion, hamstrings, quadriceps.  Patellar and Achilles reflexes 2+ bilaterally.   Positive straight leg raise bilaterally. Negative Fabers.   Gait is normal; symmetrical.    Imaging:   Findings:   5 lumbar-type vertebrae counting down from the presumed 12th ribs. The  tip the conus medullaris is at L1. The cauda equina nerve roots and  visualized thoracic cord are normal.     Normal alignment of the lumbar vertebrae. Normal lumbar lordosis. No  abnormal vertebral marrow edema. No evidence of spondylolysis.  Paraspinous soft tissues are normal. Disc degeneration at L5-S1 with  loss of disc height and disc desiccation.     On a level by level basis:     T12-L1: No spinal canal or neuroforaminal stenosis.     L1-2: No spinal canal or neuroforaminal stenosis.     L2-3: No spinal canal or neuroforaminal stenosis.     L3-4: No spinal canal or neuroforaminal stenosis.     L4-5:  No spinal canal or neuroforaminal stenosis.     L5-S1: Disc bulge with superimposed left central protrusion. No  evidence of neural impingement. Bilateral facet arthropathy. Mild left  neural foraminal stenosis. No significant spinal canal " stenosis.            Impression: Disc degeneration at L5-S1 with left central protrusion.  Mild left neural foraminal stenosis at L5-S1 without evidence of  neural impingement. No significant spinal canal stenosis.    Assessment:  Mr. Fernando Mcdonald is a 16 yo male seen today for chronic bilateral low back pain with subjective radiculopathy that does not correlate with imaging or physical exam. Differential diagnoses include lumbar disc protrusion L5-S1, myofascial pain, somatization.   Plan:   1. Patient education: I went over the above diagnoses and treatment plan with him and answered all of his questions.  2. Imaging review: I reviewed the lumbar MRI with him; I do not see evidence on his imaging study that correlates with his symptoms. He does have a mild disc protrusion that may contribute to some of his axial symptoms, however, I think this is unlikely. Regardless, patient is reluctant to consider any injections.   3. Interventions: None indicated at this time; continue with use of TENS unit.   4. Medications  1. Capsaicin 0.025% cream qid prn. I do not feel further pharmacological options would be beneficial to patient at this time.   -Discussed medical marijuana in depth with patient; I, nor is anyone in my practice, a certified provider. At this time, due to patients illicit substance use and reluctance in completing recommended therapies, I do not feel he is an appropriate candidate for medical marijuana. However, this determination is certainly at the discretion of a certifying provider.   5. Exercise program: We did discuss aquatic physical therapy; patient declined due to fear of water.   6. Behavioral Psychology: Establish with psychology for CBT and mindfulness training for chronic pain.   7. Follow up: RTC as needed.     Total time spent was 35 minutes, and more than 50% of face to face time was spent in counseling and/or coordination of care regarding the above plan.    Thank you for the consult.    GAUDENCIO Xiong, [facility]  [facility]          Answers for HPI/ROS submitted by the patient on 4/17/2018   General Symptoms: Yes  Skin Symptoms: Yes  HENT Symptoms: Yes  EYE SYMPTOMS: No  HEART SYMPTOMS: Yes  LUNG SYMPTOMS: Yes  INTESTINAL SYMPTOMS: Yes  URINARY SYMPTOMS: Yes  REPRODUCTIVE SYMPTOMS: No  SKELETAL SYMPTOMS: Yes  BLOOD SYMPTOMS: No  NERVOUS SYSTEM SYMPTOMS: Yes  MENTAL HEALTH SYMPTOMS: Yes  PEDS Symptoms: No  Fever: No  Loss of appetite: Yes  Fatigue: Yes  Night sweats: No  Chills: Yes  Increased stress: Yes  Excessive hunger: No  Excessive thirst: Yes  Itching: Yes  Rashes: Yes  Tinnitus: Yes  Bleeding gums: Yes  Snoring: Yes  Chest pain or pressure: Yes  High blood pressure: Yes  Murmurs: Yes  Heart burn or indigestion: Yes  Nausea: Yes  Vomiting: Yes  Abdominal pain: Yes  Diarrhea: Yes  Change in stools: Yes  Trouble holding urine or incontinence: Yes  Pain or burning: No  Increased frequency of urination: Yes  Blood in urine: No  Decreased frequency of urination: No  Frequent nighttime urination: Yes  Difficulty emptying bladder: Yes  Back pain: Yes  Muscle aches: Yes  Muscle weakness: Yes  Dizziness or trouble with balance: Yes  Tingling: Yes  Difficulty walking: Yes  Numbness: Yes  Nervous or Anxious: Yes  Depression: Yes  Trouble thinking or concentrating: Yes  EVERETTE 7 TOTAL SCORE: 11  PHQ-2 Score: 5  If you checked off any problems, how difficult have these problems made it for you to do your work, take care of things at home, or get along with other people?: Somewhat difficult  PHQ9 TOTAL SCORE: 12

## 2018-04-18 ASSESSMENT — PATIENT HEALTH QUESTIONNAIRE - PHQ9: SUM OF ALL RESPONSES TO PHQ QUESTIONS 1-9: 12

## 2018-04-18 ASSESSMENT — ANXIETY QUESTIONNAIRES: GAD7 TOTAL SCORE: 11

## 2018-06-12 ENCOUNTER — OFFICE VISIT (OUTPATIENT)
Dept: PSYCHOLOGY | Facility: CLINIC | Age: 18
End: 2018-06-12
Attending: INTERNAL MEDICINE
Payer: COMMERCIAL

## 2018-06-12 DIAGNOSIS — F33.41 MAJOR DEPRESSIVE DISORDER, RECURRENT, IN PARTIAL REMISSION (H): ICD-10-CM

## 2018-06-12 DIAGNOSIS — F41.9 ANXIETY: Primary | ICD-10-CM

## 2018-06-12 PROCEDURE — 90834 PSYTX W PT 45 MINUTES: CPT | Performed by: PSYCHOLOGIST

## 2018-06-12 ASSESSMENT — PATIENT HEALTH QUESTIONNAIRE - PHQ9: 5. POOR APPETITE OR OVEREATING: NEARLY EVERY DAY

## 2018-06-12 ASSESSMENT — ANXIETY QUESTIONNAIRES
2. NOT BEING ABLE TO STOP OR CONTROL WORRYING: NOT AT ALL
5. BEING SO RESTLESS THAT IT IS HARD TO SIT STILL: NOT AT ALL
7. FEELING AFRAID AS IF SOMETHING AWFUL MIGHT HAPPEN: NOT AT ALL
6. BECOMING EASILY ANNOYED OR IRRITABLE: SEVERAL DAYS
1. FEELING NERVOUS, ANXIOUS, OR ON EDGE: NEARLY EVERY DAY
3. WORRYING TOO MUCH ABOUT DIFFERENT THINGS: SEVERAL DAYS
GAD7 TOTAL SCORE: 8

## 2018-06-12 NOTE — MR AVS SNAPSHOT
"                  MRN:[id number]                      After Visit Summary   2018    Fernando Mcdonald    MRN: [id number]           Visit Information        Provider Department      2018 2:00 PM Venita Craig, SAURAV [facility]      Your next 10 appointments already scheduled     2018  1:00 PM CDT   Return Visit with Venitalila Ye SAURAV Craig   [facility] ([facility])    [address]  [facility] [address]   [phone]            2018  3:00 PM CDT   Return Visit with Venita Ye Rafa, SAURAV   [facility] ([facility])    [address]  [facility] [address] [phone]              MyChart Information     [employer] lets you send messages to your doctor, view your test results, renew your prescriptions, schedule appointments and more. To sign up, go to [email or web address] . Click on \"Log in\" on the left side of the screen, which will take you to the Welcome page. Then click on \"Sign up Now\" on the right side of the page.     You will be asked to enter the access code listed below, as well as some personal information. Please follow the directions to create your username and password.     Your access code is: [email or web address]  Expires: 2018  1:38 PM     Your access code will  in 90 days. If you need help or a new code, please call your [facility] clinic or [phone].        Care EveryWhere ID     This is your Care EveryWhere ID. This could be used by other organizations to access your [facility] medical records  [id number]        Equal Access to Services     NICOLE BROWN AH: Hadii shyam Miramontes, wadarielada luflorindaadaha, qaybta kaalmada carlos, marianna gallegos. So [facility] [phone].    ATENCIÓN: Si habla español, tiene a jacobson disposición servicios gratuitos de asistencia lingüística. Llame al [phone].    We comply with applicable federal civil rights laws " and [place] laws. We do not discriminate on the basis of race, color, national origin, age, disability, sex, sexual orientation, or gender identity.
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
## 2018-06-13 ASSESSMENT — ANXIETY QUESTIONNAIRES: GAD7 TOTAL SCORE: 8

## 2018-06-13 ASSESSMENT — PATIENT HEALTH QUESTIONNAIRE - PHQ9: SUM OF ALL RESPONSES TO PHQ QUESTIONS 1-9: 12

## 2018-08-11 ENCOUNTER — TRANSFERRED RECORDS (OUTPATIENT)
Dept: HEALTH INFORMATION MANAGEMENT | Facility: CLINIC | Age: 18
End: 2018-08-11

## 2018-09-18 ENCOUNTER — TRANSFERRED RECORDS (OUTPATIENT)
Dept: HEALTH INFORMATION MANAGEMENT | Facility: CLINIC | Age: 18
End: 2018-09-18

## 2018-10-30 ENCOUNTER — TRANSFERRED RECORDS (OUTPATIENT)
Dept: HEALTH INFORMATION MANAGEMENT | Facility: CLINIC | Age: 18
End: 2018-10-30

## 2018-11-06 ENCOUNTER — OFFICE VISIT (OUTPATIENT)
Dept: FAMILY MEDICINE | Facility: CLINIC | Age: 18
End: 2018-11-06
Payer: COMMERCIAL

## 2018-11-06 VITALS
SYSTOLIC BLOOD PRESSURE: 149 MMHG | OXYGEN SATURATION: 99 % | HEIGHT: 67 IN | BODY MASS INDEX: 27.47 KG/M2 | DIASTOLIC BLOOD PRESSURE: 86 MMHG | WEIGHT: 175 LBS | TEMPERATURE: 97.7 F | HEART RATE: 78 BPM

## 2018-11-06 DIAGNOSIS — Z76.0 ENCOUNTER FOR MEDICATION REFILL: ICD-10-CM

## 2018-11-06 DIAGNOSIS — M54.41 ACUTE MIDLINE LOW BACK PAIN WITH BILATERAL SCIATICA: Primary | ICD-10-CM

## 2018-11-06 DIAGNOSIS — M54.42 ACUTE MIDLINE LOW BACK PAIN WITH BILATERAL SCIATICA: Primary | ICD-10-CM

## 2018-11-06 PROCEDURE — 99214 OFFICE O/P EST MOD 30 MIN: CPT | Performed by: PHYSICIAN ASSISTANT

## 2018-11-06 RX ORDER — METHYLPREDNISOLONE 4 MG
TABLET, DOSE PACK ORAL
Qty: 21 TABLET | Refills: 0 | Status: SHIPPED | OUTPATIENT
Start: 2018-11-06 | End: 2023-07-26

## 2018-11-06 RX ORDER — ALBUTEROL SULFATE 90 UG/1
2 AEROSOL, METERED RESPIRATORY (INHALATION) EVERY 4 HOURS PRN
Qty: 1 INHALER | Refills: 0 | Status: SHIPPED | OUTPATIENT
Start: 2018-11-06

## 2018-11-06 NOTE — LETTER
[facility]  [address] [facility] [address]  Phone: [phone]    November 6, 2018        Fernando Mcdonald  [facility] [facility] [address]          To whom it may concern:    RE: Fernando Mcdonald    Patient was seen and treated today at our clinic for low back pain. May return to work  11/7/2018.    Please contact me for questions or concerns.      Sincerely,        Leah Rod PA-C

## 2018-11-06 NOTE — MR AVS SNAPSHOT
"              After Visit Summary   11/6/2018    Fernando Mcdonald    MRN: [id number]           Patient Information     Date Of Birth          2000        Visit Information        Provider Department      11/6/2018 3:00 PM Leah Rod PA-C [facility]        Today's Diagnoses     Acute midline low back pain with bilateral sciatica    -  1    Encounter for medication refill           Follow-ups after your visit        Who to contact     If you have questions or need follow up information about today's clinic visit or your schedule please contact [facility] directly at [phone].  Normal or non-critical lab and imaging results will be communicated to you by MyChart, letter or phone within 4 business days after the clinic has received the results. If you do not hear from us within 7 days, please contact the clinic through MyChart or phone. If you have a critical or abnormal lab result, we will notify you by phone as soon as possible.  Submit refill requests through [employer] or call your pharmacy and they will forward the refill request to us. Please allow 3 business days for your refill to be completed.          Additional Information About Your Visit        Care EveryWhere ID     This is your Care EveryWhere ID. This could be used by other organizations to access your [facility] medical records  [id number]        Your Vitals Were     Pulse Temperature Height Pulse Oximetry BMI (Body Mass Index)       78 97.7  F (36.5  C) (Oral) 5' 6.75\" (1.695 m) 99% 27.61 kg/m2        Blood Pressure from Last 3 Encounters:   11/06/18 149/86   04/17/18 140/88   04/13/18 (!) 140/96    Weight from Last 3 Encounters:   11/06/18 175 lb (79.4 kg) (76 %)*   04/17/18 202 lb (91.6 kg) (93 %)*   04/13/18 202 lb (91.6 kg) (93 %)*     * Growth percentiles are based on CDC 2-20 Years data.              Today, you had the following     No orders found for display         Today's Medication Changes        "   These changes are accurate as of 11/6/18  3:12 PM.  If you have any questions, ask your nurse or doctor.               Start taking these medicines.        Dose/Directions    albuterol 108 (90 Base) MCG/ACT inhaler   Commonly known as:  PROAIR HFA/PROVENTIL HFA/VENTOLIN HFA   Used for:  Encounter for medication refill   Started by:  Leah Rod PA-C        Dose:  2 puff   Inhale 2 puffs into the lungs every 4 hours as needed for shortness of breath / dyspnea or wheezing   Quantity:  1 Inhaler   Refills:  0       methylPREDNISolone 4 MG tablet   Commonly known as:  MEDROL DOSEPAK   Used for:  Acute midline low back pain with bilateral sciatica   Started by:  Leah Rod PA-C        Follow package instructions   Quantity:  21 tablet   Refills:  0            Where to get your medicines      These medications were sent to [facility] [address] [facility], [address]  [address] [address] [address]     Phone:  [phone]     albuterol 108 (90 Base) MCG/ACT inhaler    methylPREDNISolone 4 MG tablet                Primary Care Provider Office Phone # Fax #    [facility] [phone] [address] [facility] [address]        Equal Access to Services     NICOLE BROWN : Hadii shyam ku hadasho Soomaali, waaxda luqadaha, qaybta kaalmada adeegyada, waxay idiin hayaysha gallegos. So [facility] [phone].    ATENCIÓN: Si habla español, tiene a jacobson disposición servicios gratuitos de asistencia lingüística. ame al [phone].    We comply with applicable federal civil rights laws and [place] laws. We do not discriminate on the basis of race, color, national origin, age, disability, sex, sexual orientation, or gender identity.            Thank you!     Thank you for choosing [facility]  for your care. Our goal is always to provide you with excellent care. Hearing back from our patients is one way we can  continue to improve our services. Please take a few minutes to complete the written survey that you may receive in the mail after your visit with us. Thank you!             Your Updated Medication List - Protect others around you: Learn how to safely use, store and throw away your medicines at www.disposemymeds.org.          This list is accurate as of 11/6/18  3:12 PM.  Always use your most recent med list.                   Brand Name Dispense Instructions for use Diagnosis    albuterol 108 (90 Base) MCG/ACT inhaler    PROAIR HFA/PROVENTIL HFA/VENTOLIN HFA    1 Inhaler    Inhale 2 puffs into the lungs every 4 hours as needed for shortness of breath / dyspnea or wheezing    Encounter for medication refill       ALBUTEROL IN      2-4 puffs every 4 hours as needed        ALEVE PO      Take by mouth as needed for moderate pain        BENADRYL PO      Take 25 mg by mouth daily as needed PER PATIENT HAS NOT TAKEN THIS YEAR DUE TO ALLERGY BEING UNDER CONTROL        IBUPROFEN PO      Take 2-4 tablets by mouth as needed for moderate pain        methylPREDNISolone 4 MG tablet    MEDROL DOSEPAK    21 tablet    Follow package instructions    Acute midline low back pain with bilateral sciatica
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
## 2020-07-16 ENCOUNTER — TRANSFERRED RECORDS (OUTPATIENT)
Dept: HEALTH INFORMATION MANAGEMENT | Facility: CLINIC | Age: 20
End: 2020-07-16

## 2020-09-03 ENCOUNTER — TRANSFERRED RECORDS (OUTPATIENT)
Dept: HEALTH INFORMATION MANAGEMENT | Facility: CLINIC | Age: 20
End: 2020-09-03

## 2021-02-17 ENCOUNTER — TRANSFERRED RECORDS (OUTPATIENT)
Dept: HEALTH INFORMATION MANAGEMENT | Facility: CLINIC | Age: 21
End: 2021-02-17

## 2021-03-11 ENCOUNTER — TRANSFERRED RECORDS (OUTPATIENT)
Dept: HEALTH INFORMATION MANAGEMENT | Facility: CLINIC | Age: 21
End: 2021-03-11

## 2023-05-09 ENCOUNTER — OFFICE VISIT (OUTPATIENT)
Dept: UROLOGY | Facility: CLINIC | Age: 23
End: 2023-05-09
Payer: COMMERCIAL

## 2023-05-09 VITALS — OXYGEN SATURATION: 97 % | HEART RATE: 61 BPM | SYSTOLIC BLOOD PRESSURE: 139 MMHG | DIASTOLIC BLOOD PRESSURE: 97 MMHG

## 2023-05-09 DIAGNOSIS — R03.0 ELEVATED BP WITHOUT DIAGNOSIS OF HYPERTENSION: ICD-10-CM

## 2023-05-09 DIAGNOSIS — R10.31 RIGHT INGUINAL PAIN: Primary | ICD-10-CM

## 2023-05-09 PROCEDURE — 99204 OFFICE O/P NEW MOD 45 MIN: CPT | Performed by: UROLOGY
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
## 2023-05-09 NOTE — PATIENT INSTRUCTIONS
Please call [facility] to schedule a CT scan. [phone]  We will contact you with results.  Please call our office if you have questions or need to report any information, [phone].    Please contact your insurance company to make sure the CT scan is covered under your insurance plan.

## 2023-05-10 ENCOUNTER — HOSPITAL ENCOUNTER (OUTPATIENT)
Dept: CT IMAGING | Facility: CLINIC | Age: 23
Discharge: HOME | End: 2023-05-10
Attending: UROLOGY | Admitting: UROLOGY
Payer: COMMERCIAL

## 2023-05-10 DIAGNOSIS — R10.31 RIGHT INGUINAL PAIN: ICD-10-CM

## 2023-05-10 PROCEDURE — 250N000009 HC RX 250: Performed by: RADIOLOGY

## 2023-05-10 PROCEDURE — 74177 CT ABD & PELVIS W/CONTRAST: CPT

## 2023-05-10 PROCEDURE — 250N000011 HC RX IP 250 OP 636: Performed by: RADIOLOGY

## 2023-05-10 RX ORDER — IOPAMIDOL 755 MG/ML
77 INJECTION, SOLUTION INTRAVASCULAR ONCE
Status: COMPLETED | OUTPATIENT
Start: 2023-05-10 | End: 2023-05-10

## 2023-05-10 RX ADMIN — SODIUM CHLORIDE 61 ML: 9 INJECTION, SOLUTION INTRAVENOUS at 15:46

## 2023-05-10 RX ADMIN — IOPAMIDOL 77 ML: 755 INJECTION, SOLUTION INTRAVENOUS at 15:46

## 2023-05-12 ENCOUNTER — TELEPHONE (OUTPATIENT)
Dept: UROLOGY | Facility: CLINIC | Age: 23
End: 2023-05-12
Payer: COMMERCIAL

## 2023-05-12 NOTE — LETTER
[facility]  [address] [place] [address]  [place] [address]  Phone: [phone]    May 12, 2023        Fernando Mcdonald  [facility] [facility] [address]          To whom it may concern:    RE: Fernando Mcdonald    Patient was seen and treated at our clinic on 5/9/2023    Patient has the following restrictions:   Lift, carry, push, and pull no more than: 15Lbs through 6/22/23    Please contact me for questions or concerns.      Sincerely,        Kana Welch MD

## 2023-05-12 NOTE — TELEPHONE ENCOUNTER
[facility]    Phone Message    May a detailed message be left on voicemail: yes     Reason for Call: Requesting Results   Name/type of test: CT  Date of test: 5/10/23  Was test done at a location other than [facility] (Please fill in the location if not [facility])?: No    The patient called for results, and wondering if he needs to schedule follow up visit. He says he is still in pain and wondering what to do? Please contact patient. Thank you.    Action Taken: Message routed to:  Other: Urology    Travel Screening: Not Applicable

## 2023-05-12 NOTE — TELEPHONE ENCOUNTER
Called patient and told him to start physical therapy based on CT findings. Patient also requested a letter for lifting restrictions at work. Letter was completed and sent to patient through [employer].     Lisette MORALES RN, [facility] 05/12/23 2:28 PM

## 2023-06-08 ENCOUNTER — THERAPY VISIT (OUTPATIENT)
Dept: PHYSICAL THERAPY | Facility: CLINIC | Age: 23
End: 2023-06-08
Attending: UROLOGY
Payer: COMMERCIAL

## 2023-06-08 DIAGNOSIS — R10.31 RIGHT INGUINAL PAIN: ICD-10-CM

## 2023-06-08 PROCEDURE — 97162 PT EVAL MOD COMPLEX 30 MIN: CPT | Mod: GP | Performed by: PHYSICAL THERAPIST

## 2023-06-08 PROCEDURE — 97140 MANUAL THERAPY 1/> REGIONS: CPT | Mod: GP | Performed by: PHYSICAL THERAPIST

## 2023-06-08 PROCEDURE — 97530 THERAPEUTIC ACTIVITIES: CPT | Mod: GP | Performed by: PHYSICAL THERAPIST

## 2023-06-08 PROCEDURE — 97535 SELF CARE MNGMENT TRAINING: CPT | Mod: GP | Performed by: PHYSICAL THERAPIST

## 2023-06-08 NOTE — PROGRESS NOTES
PHYSICAL THERAPY EVALUATION  Type of Visit: Evaluation    See electronic medical record for Abuse and Falls Screening details.    Subjective      Presenting condition or subjective complaint: Onset of R sided pain from abdomen and then goes down into the perineum into the rectum. This pain started about 1 year ago for no apparent reason.  Date of onset: 19    Relevant medical history: Bladder or bowel problems; Chest pain; Heart problems; Neck injury; Pain at night or rest   Dates & types of surgery:      Prior diagnostic imaging/testing results: MRI; CT scan     Prior therapy history for the same diagnosis, illness or injury: No      Prior Level of Function   Transfers: Independent  Ambulation:   ADL:   IADL:     Living Environment  Social support: With a significant other or spouse   Type of home: House   Stairs to enter the home:         Ramp: No   Stairs inside the home: Yes   Is there a railing: Yes   Help at home: None  Equipment owned: Straight Cane; Crutches     Employment: Yes   Hobbies/Interests:      Patient goals for therapy: live without this pain to work without issues    Pain assessment: Pain present  Location: R lower abdominal area and into perineum and into the rectal area./Ratin-7/10 normally but the more active he is, the more the pain is increased.      Objective      PELVIC EVALUATION  ADDITIONAL HISTORY:  Sex assigned at birth: Male  Gender identity: Male    Pronouns: He/Him/His      Bladder History:  Feels bladder filling: Yes  Triggers for feeling of inability to wait to go to the bathroom: No    How long can you wait to urinate: 2-3 hours max, some days it's only 45 min  Gets up at night to urinate: Yes 1-3  Can stop the flow of urine when urinating: Sometimes  Volume of urine usually released: Average   Other issues: Straining to pass urine; Slow or hesitant urine stream; Trouble emptying bladder completely; Dribbling after urinating  Number of bladder  infections in last 12 months:    Fluid intake per day: 32-40ozwater 36oz (3 cans of soda)  Drinks a lot of caffeine. Occasional alcohol. Occasional energy drink or coffee.  Medications taken for bladder: No     Activities causing urine leak: Cough; Sneeze; Hurrying to the bathroom due to a strong urge to urinate (pee)    Amount of urine typically leaked: less than 2ml  Pads used to help with leaking: No        Bowel History:  Frequency of bowel movement: 1-2 times a day  Consistency of stool: Hard    Ignores the urge to defecate: No  Other bowel issues:  straining the muscles to go. Not causing pain to go. Not always feeling completely empty when he goes. Eats more fast food than he should because that's where he works.  Length of time spent trying to have a bowel movement:  5-10 min    Sexual Function History:  Sexual orientation: Bisexual    Sexually active: Yes  Lubrication used: Yes Yes  Pelvic pain: Walking; Standing; Sitting; Pelvic exams    Pain or difficulty with orgasms/erection/ejaculation: Yes   pain during any intercourse or masturbation. Any sort of ejaculation or close to will have increased pain.Ejaculation isn't as strong as it used to be. Takes 5-7 min to simmer back down then.   State of menopause:    Hormone medications: No      Are you currently pregnant: No, Have you been diagnosed with pelvic prolapse or abdominal separation: No, Do you get regular exercise: Yes, Have you tried pelvic floor strengthening exercises for 4 weeks: Yes, Do you have any history of trauma that is relevant to your care that you d like to share: No    Discussed reason for referral regarding pelvic health needs and external/internal pelvic floor muscle examination with patient/guardian.  Opportunity provided to ask questions and verbal consent for assessment and intervention was given.    PAIN: Pain Quality: Pressure, Sharp, Shooting, Stabbing and Tingling  Pain Frequency: constant  Pain is Worst: daytime or  nighttime  Pain is Exacerbated By: activity, sitting for prolonged period of time, Does have to strain a little bit for bowel movements  Pain is Relieved By: weed. Has tried IBP, tylenol, stretches, etc which haven't helped  Pain Progression: Worsened  POSTURE: Standing Posture: Rounded shoulders, Forward head, Lordosis decreased  Sitting Posture: Rounded shoulders, Forward head, Lordosis decreased  LUMBAR SCREEN:   (Degrees) Left AROM Left PROM  Right AROM Right PROM   Hip Flexion       Hip Extension       Hip Abduction       Hip Adduction       Hip Internal Rotation       Hip External Rotation       Knee Flexion       Knee Extension       Lumbar Side glide     Lumbar Flexion To knees ++ pain with radicular sx's into B LE's   Lumbar Extension -75% with ++ pain at end range   Pain: Pt is very hypersensitive to touch throughout LB and pelvic complex. Minimal to no tolerance to palpation in LB paraspinals or B SIJ's.  End feel:   Dematomes: Overall, slightly less discrimination to touch throughout the L LE.  Positive slump test on the L, mildly positive on R.   HIP SCREEN:  Strength:    Functional Strength Testing:     PELVIC/SI SCREEN:  Dana (March): decreased mobility noted on the L side. Significant pain with march testing B and required HHA to maintain stability   PAIN PROVOCATION TEST:    Left Right   MARTIN Unable to get into test position due to pain Unable to get into test position due to pain   FADIR/Labrum/EDNA     Femoral Nerve     Nate's     Piriformis     Quadrant Testing     SLR Positive Positive   Slump Positive Positive   Stork with Extension     Misael            PELVIS/SI SPECIAL TESTS:    Left Right Additional Notes   ASLR + + Pain and poor stability   Gaenslen's Test      Pelvic Compression      Pelvic Gapping      Sacral Thrust      Thigh Thrust        SIJ Palpatory    Standing forward bend (first/farthest superior movement, note side) WFL, however very painful   Sitting forward bend  (first/farthest superior movement, note side)    Pubic symphysis Superior:  Inferior:      BREATHING SYMMETRY: Decreased rib cage mobility    PELVIC EXAM  External Visual Inspection:  At rest: Elevated perineal body  With voluntary pelvic floor contraction: Perineal elevation  Relaxation of PFM: Partial/delayed relaxation    Integumentary:   Penile: Pt has small red spots that have a rough appearance throughout shaft of penis, scrotum, and in inner thigh folds    External Digital Palpation per Perineum:   Ischiocavernosis: Tightness, Tenderness, Pain  Bulbo cavernosis: Tightness, Tenderness, Pain  Transverse perineal: Tightness, Tenderness, Pain  Levator ani: Tightness, Tenderness, Pain  Perineal body: Tightness, Tenderness, Pain  Spermatic cord: Tightness, Tenderness, Pain    Scar:   Location/Type: NA  Mobility:     Internal Digital Palpation:  Per Vagina:      Per Rectum:  Tenderness  Tone: high  Digital Muscle Performance: P (Power): 2, poor squeeze noted of EAS. Unable to palpate to level of levator ani due to tension in EAS and anal canal region  Relaxation Post-Contraction: Partial/delayed relaxation Poor ability to length or open anorectal angle when asked to bear down.       Pelvic Organ Prolapse:       ABDOMINAL ASSESSMENT  Diastasis Rectus Abdominis (NINO):      Abdominal Activation/Strength:     Scar:   Location/Type: NA  Mobility:     Fascial Tension/Restriction/Tone: Hypomobile, Pain Very hypersensitive to touch throughout abdominal area but especially into R sided area.    BIOFEEDBACK:  Position:   Surface Electrodes:     Abdominals:     Perianals:       DERMATOMES: decreased overall sensation noted throughout L LE compared to R.  DTR S:    Left Right   C5 (Biceps)     C6 (Brachioradialis)     C7 (Triceps)     L4 (Quad) 1 1   S1 (Achilles) 1 1       Assessment & Plan   CLINICAL IMPRESSIONS   Medical Diagnosis: Groin/Pelvic pain    Treatment Diagnosis: Pelvic floor dysfunction   Impression/Assessment:  Patient is a 22 year old male with R sided abdominal, groin, and pelvic complaints.  The following significant findings have been identified: Pain, Decreased ROM/flexibility, Decreased strength, Impaired balance, Decreased proprioception, Impaired sensation, Impaired gait, Impaired muscle performance, Decreased activity tolerance and Impaired posture. These impairments interfere with their ability to perform self care tasks, work tasks, recreational activities, household chores, driving , household mobility and community mobility as compared to previous level of function.     Clinical Decision Making (Complexity):   Clinical Presentation: Evolving/Changing  Clinical Presentation Rationale: based on medical and personal factors listed in PT evaluation  Clinical Decision Making (Complexity): Moderate complexity    PLAN OF CARE  Treatment Interventions:  Modalities: Biofeedback, Dry Needling  Interventions: Manual Therapy, Neuromuscular Re-education, Therapeutic Activity, Therapeutic Exercise, Self-Care/Home Management    Long Term Goals     PT Goal 1  Goal Identifier: pelvic pain  Goal Description: Pt to be able to complete all daily activities including standing, walking, and sitting with pain < 3/10  Rationale: to maximize safety and independence with performance of ADLs and functional tasks;to maximize safety and independence within the home;to maximize safety and independence within the community;to maximize safety and independence with transportation;to maximize safety and independence with self cares  Target Date: 08/31/23  PT Goal 2  Goal Identifier: Urinary symptoms  Goal Description: Pt to note normal emptying and no leakage of urine.  Rationale: to maximize safety and independence with performance of ADLs and functional tasks;to maximize safety and independence with self cares  Target Date: 08/31/23      Frequency of Treatment: 1x/wk  Duration of Treatment: 12 weeks    Recommended Referrals to Other  Professionals: Physical Therapy  Education Assessment:   Learner/Method: Patient;Listening;Reading;Demonstration;Pictures/Video  Education Comments: Educated on pathology, involved anatomy, answered patient's questions.    Risks and benefits of evaluation/treatment have been explained.   Patient/Family/caregiver agrees with Plan of Care.     Evaluation Time:     PT Eval, Moderate Complexity Minutes (34800): 40      Signing Clinician: Lor Choi, HUBERT HOPKINS [facility]                                                                                   OUTPATIENT PHYSICAL THERAPY      PLAN OF TREATMENT FOR OUTPATIENT REHABILITATION   Patient's Last Name, First Name, Fernando Hartley YOB: 2000   Provider's Name   [facility]   Medical Record No.  [id number]     Onset Date: 12/08/21  Start of Care Date: 06/08/23     Medical Diagnosis:  Groin/Pelvic pain      PT Treatment Diagnosis:  Pelvic floor dysfunction Plan of Treatment  Frequency/Duration: 1x/wk/ 12 weeks    Certification date from 06/08/23 to 08/31/23         See note for plan of treatment details and functional goals     Lor Choi, HUBERT                         I CERTIFY THE NEED FOR THESE SERVICES FURNISHED UNDER        THIS PLAN OF TREATMENT AND WHILE UNDER MY CARE     (Physician attestation of this document indicates review and certification of the therapy plan).                  Referring Provider:  Kana Welch      Initial Assessment  See Epic Evaluation- Start of Care Date: 06/08/23

## 2023-06-22 ENCOUNTER — THERAPY VISIT (OUTPATIENT)
Dept: PHYSICAL THERAPY | Facility: CLINIC | Age: 23
End: 2023-06-22
Attending: UROLOGY
Payer: COMMERCIAL

## 2023-06-22 DIAGNOSIS — R10.31 RIGHT INGUINAL PAIN: ICD-10-CM

## 2023-06-22 PROCEDURE — 97140 MANUAL THERAPY 1/> REGIONS: CPT | Mod: GP | Performed by: PHYSICAL THERAPIST

## 2023-06-23 PROBLEM — R10.31 RIGHT INGUINAL PAIN: Status: ACTIVE | Noted: 2023-06-23

## 2023-06-29 ENCOUNTER — THERAPY VISIT (OUTPATIENT)
Dept: PHYSICAL THERAPY | Facility: CLINIC | Age: 23
End: 2023-06-29
Attending: UROLOGY
Payer: COMMERCIAL

## 2023-06-29 DIAGNOSIS — R10.31 RIGHT INGUINAL PAIN: Primary | ICD-10-CM

## 2023-06-29 PROCEDURE — 97140 MANUAL THERAPY 1/> REGIONS: CPT | Mod: GP | Performed by: PHYSICAL THERAPIST

## 2023-07-18 ENCOUNTER — THERAPY VISIT (OUTPATIENT)
Dept: PHYSICAL THERAPY | Facility: CLINIC | Age: 23
End: 2023-07-18
Payer: COMMERCIAL

## 2023-07-18 DIAGNOSIS — R10.31 RIGHT INGUINAL PAIN: Primary | ICD-10-CM

## 2023-07-18 PROCEDURE — 97535 SELF CARE MNGMENT TRAINING: CPT | Mod: GP | Performed by: PHYSICAL THERAPIST

## 2023-07-18 PROCEDURE — 97110 THERAPEUTIC EXERCISES: CPT | Mod: GP | Performed by: PHYSICAL THERAPIST

## 2023-07-18 PROCEDURE — 97140 MANUAL THERAPY 1/> REGIONS: CPT | Mod: GP | Performed by: PHYSICAL THERAPIST

## 2023-07-22 ENCOUNTER — HEALTH MAINTENANCE LETTER (OUTPATIENT)
Age: 23
End: 2023-07-22

## 2023-07-25 ENCOUNTER — THERAPY VISIT (OUTPATIENT)
Dept: PHYSICAL THERAPY | Facility: CLINIC | Age: 23
End: 2023-07-25
Payer: COMMERCIAL

## 2023-07-25 DIAGNOSIS — R10.31 RIGHT INGUINAL PAIN: Primary | ICD-10-CM

## 2023-07-25 PROCEDURE — 97140 MANUAL THERAPY 1/> REGIONS: CPT | Mod: GP | Performed by: PHYSICAL THERAPIST

## 2023-07-26 ENCOUNTER — OFFICE VISIT (OUTPATIENT)
Dept: FAMILY MEDICINE | Facility: CLINIC | Age: 23
End: 2023-07-26
Payer: COMMERCIAL

## 2023-07-26 VITALS
TEMPERATURE: 97.3 F | WEIGHT: 181 LBS | BODY MASS INDEX: 28.41 KG/M2 | HEART RATE: 83 BPM | HEIGHT: 67 IN | SYSTOLIC BLOOD PRESSURE: 120 MMHG | DIASTOLIC BLOOD PRESSURE: 78 MMHG | RESPIRATION RATE: 12 BRPM | OXYGEN SATURATION: 98 %

## 2023-07-26 DIAGNOSIS — G89.29 CHRONIC LEFT SHOULDER PAIN: ICD-10-CM

## 2023-07-26 DIAGNOSIS — M54.41 CHRONIC LOW BACK PAIN WITH BILATERAL SCIATICA, UNSPECIFIED BACK PAIN LATERALITY: ICD-10-CM

## 2023-07-26 DIAGNOSIS — F15.11 METHAMPHETAMINE USE DISORDER, MILD, IN SUSTAINED REMISSION (H): ICD-10-CM

## 2023-07-26 DIAGNOSIS — G89.29 CHRONIC LOW BACK PAIN WITH BILATERAL SCIATICA, UNSPECIFIED BACK PAIN LATERALITY: ICD-10-CM

## 2023-07-26 DIAGNOSIS — M25.512 CHRONIC LEFT SHOULDER PAIN: ICD-10-CM

## 2023-07-26 DIAGNOSIS — R10.31 RIGHT INGUINAL PAIN: Primary | ICD-10-CM

## 2023-07-26 DIAGNOSIS — M54.42 CHRONIC LOW BACK PAIN WITH BILATERAL SCIATICA, UNSPECIFIED BACK PAIN LATERALITY: ICD-10-CM

## 2023-07-26 PROCEDURE — 99203 OFFICE O/P NEW LOW 30 MIN: CPT

## 2023-07-26 RX ORDER — TIZANIDINE 2 MG/1
2 TABLET ORAL 3 TIMES DAILY PRN
Qty: 30 TABLET | Refills: 1 | Status: SHIPPED | OUTPATIENT
Start: 2023-07-26 | End: 2023-08-10

## 2023-07-26 NOTE — PROGRESS NOTES
"  Assessment & Plan     1. Right inguinal pain  Unclear etiology, work up unremarkable, feels tight/pulling. Continues PT but has not had much improvement with his pain. Pain management referral placed. Small amount of muscle relaxer to see if that will help during PT sessions and sleep until can see pain.  - Pain Management  Referral; Future  - tiZANidine (ZANAFLEX) 2 MG tablet; Take 1 tablet (2 mg) by mouth 3 times daily as needed for muscle spasms  Dispense: 30 tablet; Refill: 1    2. Chronic low back pain with bilateral sciatica, unspecified back pain laterality  MRI with lumbar disc degeneration and stenosis but no nerve impingement. Was following with spine/neurology prior to losing insurance. Referral placed.   - Spine  Referral; Future  - Pain Management  Referral; Future  - tiZANidine (ZANAFLEX) 2 MG tablet; Take 1 tablet (2 mg) by mouth 3 times daily as needed for muscle spasms  Dispense: 30 tablet; Refill: 1    3. Chronic left shoulder pain  Ongoing for 10 years without progression or improvement. Imaging normal.   - Pain Management  Referral; Future    4. Methamphetamine use disorder, mild, in sustained remission (H)  No current use.     Plan to follow up in 1-2 months for recheck, also due for physical.      Nicotine/Tobacco Cessation:  He reports that he has been smoking vaping device. He uses smokeless tobacco.  Nicotine/Tobacco Cessation Plan:   Information offered: Patient not interested at this time      BMI:   Estimated body mass index is 28.35 kg/m  as calculated from the following:    Height as of this encounter: 1.702 m (5' 7\").    Weight as of this encounter: 82.1 kg (181 lb).   Weight management plan: Discussed healthy diet and exercise guidelines        GAUDENCIO Joe CNP  [facility]   Fernando is a 22 year old, presenting for the following health issues:  Office Visit (Establish care. Pt has pain on right side " from testicle to mid abdomen. Making life difficult. Left arm feels numb and tingling down to his pinky finger.) and Recheck Medication      7/26/2023     2:58 PM   Additional Questions   Roomed by kianna tolliver   Accompanied by alone         7/26/2023     2:58 PM   Patient Reported Additional Medications   Patient reports taking the following new medications none       History of Present Illness       Back Pain:  He presents for follow up of back pain. Patient's back pain is a chronic problem.  Location of back pain:  Right lower back, left lower back and left shoulder  Description of back pain: burning, sharp and shooting  Back pain spreads: right buttocks, left buttocks, right thigh, left thigh, right knee and left knee    Since patient first noticed back pain, pain is: gradually worsening  Does back pain interfere with his job:  Yes       Reason for visit:  Physical therapy recommendation and shoulder pain    He eats 2-3 servings of fruits and vegetables daily.He consumes 5 sweetened beverage(s) daily.He exercises with enough effort to increase his heart rate 30 to 60 minutes per day.  He exercises with enough effort to increase his heart rate 4 days per week.   He is taking medications regularly.     Fernando presents today to establish care and with concerns of multiple areas of pain.    Ongoing right groin pulling/squeezing that goes up into his abdomen. He has had multiple unremarkable abdominal/pelvic CT and scrotal ultrasounds. He is currently going to physical therapy without much improvement. Reports it feels tight/tense and has not been relieved. Has associated nausea.     Chronic left shoulder/arm pain that has continued for approximately 10 years. No known injury. Multiple xrays and MRI. Impacts his ability to sleep, especially if he turns onto his left side.     Chronic lower back pain that radiates to bilateral lower extremities.  Was following with neurology years ago but was not able to continue follow up  "due to loss of insurance. No changes in bowel/bladder.  MRI: Impression: Disc degeneration at L5-S1 with left central protrusion.  Mild left neural foraminal stenosis at L5-S1 without evidence of  neural impingement. No significant spinal canal stenosis.    Will occasionally take 2000 mg of acetaminophen at one time for the pain.     History of smoking meth.     Review of Systems     CONSTITUTIONAL: NEGATIVE for fever, chills, change in weight  MUSCULOSKELETAL: HPI  NEURO: negative, local weakness, speech problems, and incoordination  PSYCHIATRIC: NEGATIVE for changes in mood or affect      Objective    /78 (BP Location: Left arm, Patient Position: Sitting, Cuff Size: Adult Small)   Pulse 83   Temp 97.3  F (36.3  C) (Oral)   Resp 12   Ht 1.702 m (5' 7\")   Wt 82.1 kg (181 lb)   SpO2 98%   BMI 28.35 kg/m    Body mass index is 28.35 kg/m .    Physical Exam   GENERAL: healthy, alert and no distress  EYES: Eyes grossly normal to inspection, PERRL and conjunctivae and sclerae normal  ABDOMEN: soft, nontender, no hepatosplenomegaly, no masses and bowel sounds normal  MS: normal muscle tone, normal range of motion, no edema, and gait normal, no ataxia  SKIN: no suspicious lesions or rashes  NEURO: Normal strength and tone, mentation intact and speech normal  PSYCH: mentation appears normal, affect normal/bright                  "

## 2023-07-28 PROBLEM — F15.11 METHAMPHETAMINE USE DISORDER, MILD, IN SUSTAINED REMISSION (H): Status: ACTIVE | Noted: 2023-07-28

## 2023-08-10 ENCOUNTER — THERAPY VISIT (OUTPATIENT)
Dept: PHYSICAL THERAPY | Facility: CLINIC | Age: 23
End: 2023-08-10
Payer: COMMERCIAL

## 2023-08-10 DIAGNOSIS — M54.41 CHRONIC LOW BACK PAIN WITH BILATERAL SCIATICA, UNSPECIFIED BACK PAIN LATERALITY: ICD-10-CM

## 2023-08-10 DIAGNOSIS — R10.31 RIGHT INGUINAL PAIN: ICD-10-CM

## 2023-08-10 DIAGNOSIS — G89.29 CHRONIC LOW BACK PAIN WITH BILATERAL SCIATICA, UNSPECIFIED BACK PAIN LATERALITY: ICD-10-CM

## 2023-08-10 DIAGNOSIS — R10.31 RIGHT INGUINAL PAIN: Primary | ICD-10-CM

## 2023-08-10 DIAGNOSIS — M54.42 CHRONIC LOW BACK PAIN WITH BILATERAL SCIATICA, UNSPECIFIED BACK PAIN LATERALITY: ICD-10-CM

## 2023-08-10 PROCEDURE — 97140 MANUAL THERAPY 1/> REGIONS: CPT | Mod: GP | Performed by: PHYSICAL THERAPIST

## 2023-08-10 PROCEDURE — 97110 THERAPEUTIC EXERCISES: CPT | Mod: GP | Performed by: PHYSICAL THERAPIST

## 2023-08-10 RX ORDER — TIZANIDINE 2 MG/1
2 TABLET ORAL 3 TIMES DAILY PRN
Qty: 30 TABLET | Refills: 1 | Status: SHIPPED | OUTPATIENT
Start: 2023-08-10

## 2023-08-10 NOTE — TELEPHONE ENCOUNTER
General Call    Contacts         Type Contact Phone/Fax    08/10/2023 03:36 PM CDT Phone (Incoming) [id number] - [place], [place] - [address] (Pharmacy) [phone]     [place]          Reason for Call:  pharmacy calling and need a provider to sign on on this for patient as she is not credentialed for Medicaid insurance.    What are your questions or concerns:  Please sign off on medication for patient.   Disp Refills Start End LINDSEY   tiZANidine (ZANAFLEX) 2 MG tablet 30 tablet 1 7/26/2023  No   Sig - Route: Take 1 tablet (2 mg) by mouth 3 times daily as needed for muscle spasms - Oral       Date of last appointment with provider: 7/26/23

## 2023-08-11 NOTE — PROGRESS NOTES
Date:8/14/2023      COMPREHENSIVE PAIN CLINIC INITIAL EVALUATION    I had the pleasure of meeting Mr. Fernando Mcdonald on 8/14/2023 in the [facility] per request of  GAUDENCIO Smalls with regards to his pain.    Subjective:  The patient is a 22 year old male with past medical history of anxiety, concussion, chronic low back pain, chronic neck pain, L) shoulder pain h/o methamphetamine use, daily THC use who presents for evaluation of chronic pain.     He would like to concentrate on left shoulder and arm pain today.  He states he has occassional L) neck pain and headaches.   Left shoulder pain started 1.5 years ago without a precipitating event.  Pain has progressively worsened. He has numbness or tingling on top of the left shoulder into 4th and 5 fingers which is constant.  He has weakness in L) arm. Pain interferes with ADL's and work.  The patient describes the pain as constant aching.  He reports that the pain is made worse by moving his left arm.  His pain is improved with smoking/vaping recreational THC.  He rates his pain score at 7/10, but it can be as low as 5/10 or as severe as 9/10.  Patient has also been referred to [facility] and physical therapy.  Patient has been referred to [facility] Center on 07/26/2023 by Nicole Alston CNP and for physical therapy on 06/08/2023 by Dr. Kana Welch. He is a  for [employer].  He has a 15lb weight restriction per PT.  He is attending [facility] once a week for last 2 months. He has vertigo. He was evaluated for his inguinal pain by Urology and findings were unremarkable.  He also has low back pain.       Progress Notes Reviewed:  07/26/2023 GAUDENCIO Joe  07/11/2023 ED - abdominal pain  06/04/2023 ED - cough, HA, SOB  05/09/2023 Dr. Kana Welch - R) groin pain    He denies any new problems with falls or balance, any new numbness or weakness of the arms or legs, any new bowel or bladder incontinence, any night sweats or unexplained  fevers, or any sudden or unexpected weight loss.    Fernando Mcdonald has been seen at a pain clinic in the [facility] in 2016..        Patient Supplied Answers To the  Pain Questionnaire      8/14/2023     1:39 PM    Pain -  Patient Entered Questionnaire/Answers   What number best describes your pain right now:  0 = No pain  to  10 = Worst pain imaginable 7   How would you describe the pain sharp    numbness    dull, aching    throbbing   Which of the following worsen your pain lying down    standing    sitting    walking    exercise    relaxation    coughing / sneezing    urination    bowel movements   Which of the following improve or reduce your pain nothing relieves the pain   What number best describes your average pain for the past week:  0 = No pain  to  10 = Worst pain imaginable 8   What number best describes your LOWEST pain in past 24 hours:  0 = No pain  to  10 = Worst pain imaginable 6   What number best describes your WORST pain in past 24 hours:  0 = No pain  to  10 = Worst pain imaginable 9   When is your pain worst Constant   What non-medicine treatments have you already had for your pain physical therapy    relaxation training    TENS (electrical stimulator)    exercise    other         Current treatments:  Ibuprofen 2-4 tabs PRN  Tizanidine 2mg TID  Recreational marijuana    Previous medication treatments included:  Anti-convulsants:   Muscle relaxors: no  Anti-depressants: no  Acetaminophen/NSAIDs: not helpful  Topicals: Tiger Balm, Chilo Rosario, Icy Hot - minimal benefit  Opioids: no    Other treatments have included:  Physical therapy: attending currently  Pain Psychology: no  Chiropractic: for low back pain years ago  Acupuncture: no  TENs Unit: has a TENS unit - not helpful  Injections: no  Surgeries: no spine or joint surgeries    Past Medical History:  Medical history reviewed.   Past Medical History:   Diagnosis Date    Anxiety     Not taking medication. Saw  psychiatrist in 2011/2012. No specific diagnosis and no specific treatment as per patient    Concussion 9/25/2012    Low back pain 2014    No back injury    Syncope 9/25/2012      Patient Active Problem List   Diagnosis    Low back pain    Anxiety    Chronic low back pain with bilateral sciatica, unspecified back pain laterality    Right inguinal pain    Methamphetamine use disorder, mild, in sustained remission (H)       Past Surgical History:  Pertinent surgical history reviewed.   Past Surgical History:   Procedure Laterality Date    ABDOMEN SURGERY  1999    Pyloric stenosis as infant         Medications: Pertinent medications reviewed.  Current Outpatient Medications   Medication Sig Dispense Refill    IBUPROFEN PO Take 2-4 tablets by mouth as needed for moderate pain      tiZANidine (ZANAFLEX) 2 MG tablet Take 1 tablet (2 mg) by mouth 3 times daily as needed for muscle spasms 30 tablet 1    albuterol (PROAIR HFA/PROVENTIL HFA/VENTOLIN HFA) 108 (90 Base) MCG/ACT inhaler Inhale 2 puffs into the lungs every 4 hours as needed for shortness of breath / dyspnea or wheezing (Patient not taking: Reported on 8/14/2023) 1 Inhaler 0    ALBUTEROL IN 2-4 puffs every 4 hours as needed (Patient not taking: Reported on 8/14/2023)         [place] Prescription Monitoring Program reviewed 8/14/2023.  No concern for abuse or misuse of controlled medications based on this report.  02/09/2023 hydrocodone-acet 5/325mg 12 tab sfor 3 days  1 script for opioids in 2023.      Allergies: Pertinent allergies reviewed.     Allergies   Allergen Reactions    Cefzil [Cefprozil] Other (See Comments)     Swelling and itchiness of the thraot       Family History:   family history includes Asthma in his mother; Hypertension in his mother; Thyroid Disease in his mother.    Social History:   He lives in a house with his girlfriend, girlfriends's mom and sister.  He is single.  He works at [employer] as a cook.  He is independent in ADL'.s  He  reports  that he has been smoking vaping device. He uses smokeless tobacco. He reports current drug use. Drug: Marijuana. He reports that he does not drink alcohol.  Social History     Social History Narrative    Presently living with biologic mother, who gave pt up for adoption at birth.  Adoptive birth mother  when he was 4, of pancreatic cancer, adoptive father remarried, and this woman forced him out of the house- he re-connected with biologic mother 2014 and now living there.           Review of Systems:      (Positive responses bolded)  GENERAL: fever/chills, fatigue, general unwell feeling, weight gain/loss.  HEAD/EYES:  headache, dizziness, or vision changes.    EARS/NOSE/THROAT: nosebleeds, hearing loss, sinus infection, earache, tinnitus.  IMMUNE:  allergies, cancer, immune deficiency, or infections.  SKIN:  itching, rash, hives  HEME/Lymphatic: anemia, easy bruising, easy bleeding.  RESPIRATORY: cough, wheezing, or shortness of breath  CARDIOVASCULAR/Circulation: extremity edema, syncope, hypertension, tachycardia, or angina.  GASTROINTESTINAL: abdominal pain, nausea/emesis, diarrhea, constipation, hematochezia, or melena.  ENDOCRINE: diabetes, steroid use, thyroid disease or osteoporosis.  MUSCULOSKELETAL: myalgias joint pain, stiffness, neck pain, back pain, arthritis, or gout. R) inguinal pain  GENITOURINARY: frequency, urgency, dysuria, difficulty voiding, hematuria or incontinence.  NEUROLOGIC: weakness, numbness, paresthesias, seizure, tremor, stroke or memory loss.  PSYCHIATRIC: depression, anxiety, stress, suicidal thoughts or mood swings. Suicide attempt x 1 as teenager.    Physical Exam:  /76   Pulse 75   Wt 85.3 kg (188 lb 1.6 oz)   SpO2 100%   BMI 29.46 kg/m      Physical Exam   Constitutional: He is oriented to person, place, and time.  He appears well-developed and well-nourished. He is not in acute distress.   HENT:     Head: Normocephalic and atraumatic.     Eyes: Pupils are  equal, round, and reactive to light. EOM are normal. No scleral icterus.   Neurological: He is alert and oriented to person, place, and time. Coordination grossly normal.  Romberg test negative.  Skin: Skin is warm and dry. He is not diaphoretic.   Psychiatric: He has a normal mood and affect. His behavior is normal. Judgment and thought content normal.  MSK: Gait is normal.  L) shoulder ROM is limited with abduction 75 degrees, tenderness over biceps tendon and AC joint.  Cervical spine -   ROM is intact and unrestricted in all planes.  Strength is 5/5 bilaterally in all muscle groups.  Sensation is intact to R) upper extremity.  Sensation is less in C7-8 dermatones.  Spurling's test is negative. He has tenderness to L) para cervical/thoracic muscles           Imaging:  DATE: 06/02/2021.   CLINICAL HISTORY:   Patient with left arm numbness.     TECHNIQUE:   Multiplanar, multi-sequence MRI examination of the cervical spine was performed.   The following sequences were obtained: Sagittal T1 weighted, T2 weighted, and STIR and axial T2 gradient echo.     COMPARISON:   None available.     FINDINGS:   There is no acute fracture, traumatic malalignment, or ligamentous injury involving the cervical spine. Vertebral body heights are maintained without evidence of compression deformity. No evidence of pathologic marrow edema or pathologic lesions. No evidence of prevertebral soft tissue edema.     There is mild cervical spondylosis. There is straightening of the alignment of the cervical spine which may relate to patient positioning versus muscle spasm.     The cervical cord is normal in morphology and signal.     C2-3, C3-4, C4-5: No significant central canal stenosis or foraminal narrowing.     C5-6: Moderate to severe left foraminal narrowing which appears to be secondary to left uncovertebral hypertrophy and posterior endplate osteophytic ridging eccentric to the left. No significant central canal stenosis.     C6-7: At  least mild left foraminal narrowing secondary to uncovertebral hypertrophy and mild left facet arthropathy. No significant central canal stenosis.     C7-T1: The disc is normal. There is no facet arthropathy. There is no central canal stenosis. There is no foraminal narrowing.     IMPRESSION:   1. No acute fracture, traumatic malalignment, ligamentous injury, or significant central canal stenosis of the cervical spine.     2. At C5-6, moderate to severe left foraminal narrowing.     3. At C6-7, at least mild left foraminal narrowing.         EMG:  na    Diagnosis:      Plan:  A multimodal plan was developed today to treat your pain.  Multimodal analgesia is a strategy that reduces reliance on opioids through the use of non-opioid analgesics and therapies that have different mechanisms of action.      Diagnostics: ordered cervical and L) shoulder MRI        Medications:    The following OTC pain medications may be helpful, use as directed: Voltaren Gel 1%, CBD products, Capsaicin products, Australian Dream Cream, Epson It,  Lidocaine Patch, Solanpas, Biofreeze, Aspercream, Tiger Balm and Lionel Emu cream.  Apply heat or cold PRN.      Therapies:    PHYSICAL THERAPY: Encourage patient to continue working with physical therapy.  Discussed the importance of core strengthening, ROM, stretching exercises with the patient and how each of these entities is important in decreasing pain.  Explained to the patient that the purpose of physical therapy is to teach the patient a home exercise program.  These exercises need to be performed every day in order to decrease pain and prevent future occurrences of pain.        Discussed Acupuncture.    He already has TENs unit.      Follow up: 1-2 weeks to review imaging and make further recommendations.      GAUDENCIO Quintero, RN, CNP, FNP  [facility]        BILLING TIME DOCUMENTATION:   The total TIME spent on this patient on the date  of the encounter/appointment was 60 minutes.          Answers submitted by the patient for this visit:  EVERETTE-7 (Submitted on 8/14/2023)  EVERETTE 7 TOTAL SCORE: 0

## 2023-08-14 ENCOUNTER — OFFICE VISIT (OUTPATIENT)
Dept: PALLIATIVE MEDICINE | Facility: OTHER | Age: 23
End: 2023-08-14
Attending: NURSE PRACTITIONER
Payer: COMMERCIAL

## 2023-08-14 VITALS
SYSTOLIC BLOOD PRESSURE: 121 MMHG | DIASTOLIC BLOOD PRESSURE: 76 MMHG | HEART RATE: 75 BPM | BODY MASS INDEX: 29.46 KG/M2 | WEIGHT: 188.1 LBS | OXYGEN SATURATION: 100 %

## 2023-08-14 DIAGNOSIS — F15.11 METHAMPHETAMINE USE DISORDER, MILD, IN SUSTAINED REMISSION (H): ICD-10-CM

## 2023-08-14 DIAGNOSIS — G89.29 CHRONIC LEFT SHOULDER PAIN: ICD-10-CM

## 2023-08-14 DIAGNOSIS — G89.29 CHRONIC INTRACTABLE PAIN: ICD-10-CM

## 2023-08-14 DIAGNOSIS — M54.10 RADICULOPATHY, UNSPECIFIED SPINAL REGION: Primary | ICD-10-CM

## 2023-08-14 DIAGNOSIS — M25.512 CHRONIC LEFT SHOULDER PAIN: ICD-10-CM

## 2023-08-14 PROCEDURE — G0463 HOSPITAL OUTPT CLINIC VISIT: HCPCS | Performed by: NURSE PRACTITIONER

## 2023-08-14 PROCEDURE — 99205 OFFICE O/P NEW HI 60 MIN: CPT | Performed by: NURSE PRACTITIONER

## 2023-08-14 ASSESSMENT — PAIN SCALES - PAIN ENJOYMENT GENERAL ACTIVITY SCALE (PEG)
INTERFERED_ENJOYMENT_LIFE: 9
INTERFERED_GENERAL_ACTIVITY: 8
PEG_TOTALSCORE: 8.33
AVG_PAIN_PASTWEEK: 8

## 2023-08-14 ASSESSMENT — ANXIETY QUESTIONNAIRES
GAD7 TOTAL SCORE: 0
2. NOT BEING ABLE TO STOP OR CONTROL WORRYING: NOT AT ALL
6. BECOMING EASILY ANNOYED OR IRRITABLE: NOT AT ALL
1. FEELING NERVOUS, ANXIOUS, OR ON EDGE: NOT AT ALL
4. TROUBLE RELAXING: NOT AT ALL
3. WORRYING TOO MUCH ABOUT DIFFERENT THINGS: NOT AT ALL
5. BEING SO RESTLESS THAT IT IS HARD TO SIT STILL: NOT AT ALL
GAD7 TOTAL SCORE: 0
7. FEELING AFRAID AS IF SOMETHING AWFUL MIGHT HAPPEN: NOT AT ALL

## 2023-08-14 ASSESSMENT — PAIN SCALES - GENERAL: PAINLEVEL: EXTREME PAIN (8)

## 2023-08-14 NOTE — NURSING NOTE
Patient presents to the clinic today for a follow up with GAUDENCIO Rush CNP  regarding Pain Management.           8/14/2023     1:00 PM   PEG Score   PEG Total Score 8.33        Chief Complaint   Patient presents with    Pain     Left shoulder pain- Down the arm to the fingers- Pops and grinds when rotating the shoulder. Right groin pain up to the hip and side stomach area- Lower back pain.       Groin pain- will pull and feeling like its being stabbed. May have a cyst.     Tram Camargo MA  [facility]

## 2023-08-14 NOTE — PATIENT INSTRUCTIONS
[facility] Number:  [phone]  Call with any questions about your care and for scheduling assistance.   Calls are returned Monday through Friday between 8 AM and 4:30 PM. We usually get back to you within 2 business days depending on the issue/request.    If we are prescribing your medications:  For opioid medication refills, call the clinic or send a Arkt message 7 days in advance.  Please include:  Name of requested medication  Name of the pharmacy.  For non-opioid medications, call your pharmacy directly to request a refill. Please allow 3-4 days to be processed.   Per [place] State Law:  All controlled substance prescriptions must be filled within 30 days of being written.    For those controlled substances allowing refills, pickup must occur within 30 days of last fill.    Plan:  A multimodal plan was developed today to treat your pain.  Multimodal analgesia is a strategy that reduces reliance on opioids through the use of non-opioid analgesics and therapies that have different mechanisms of action.      Diagnostics: ordered cervical and L) shoulder MRI        Medications:    The following OTC pain medications may be helpful, use as directed: Voltaren Gel 1%, CBD products, Capsaicin products, Australian Dream Cream, Epson It,  Lidocaine Patch, Solanpas, Biofreeze, Aspercream, Tiger Balm and Lionel Emu cream.  Apply heat or cold PRN.      Therapies:    PHYSICAL THERAPY: Encourage patient to continue working with physical therapy.  Discussed the importance of core strengthening, ROM, stretching exercises with the patient and how each of these entities is important in decreasing pain.  Explained to the patient that the purpose of physical therapy is to teach the patient a home exercise program.  These exercises need to be performed every day in order to decrease pain and prevent future occurrences of pain.        Discussed Acupuncture.    He already has TENs  unit.      Follow up: 1-2 weeks to review imaging and make further recommendations.      GAUDENCIO Quintero, RN, CNP, FNP  [facility]        We believe regular attendance is key to your success in our program!    Any time you are unable to keep your appointment we ask that you call us at least 24 hours in advance to cancel.This will allow us to offer the appointment time to another patient.   Multiple missed appointments may lead to dismissal from the clinic.

## 2023-08-17 ENCOUNTER — THERAPY VISIT (OUTPATIENT)
Dept: PHYSICAL THERAPY | Facility: CLINIC | Age: 23
End: 2023-08-17
Payer: COMMERCIAL

## 2023-08-17 DIAGNOSIS — R10.31 RIGHT INGUINAL PAIN: Primary | ICD-10-CM

## 2023-08-17 PROCEDURE — 97140 MANUAL THERAPY 1/> REGIONS: CPT | Mod: GP | Performed by: PHYSICAL THERAPIST

## 2023-08-24 ENCOUNTER — THERAPY VISIT (OUTPATIENT)
Dept: PHYSICAL THERAPY | Facility: CLINIC | Age: 23
End: 2023-08-24
Payer: COMMERCIAL

## 2023-08-24 DIAGNOSIS — R10.31 RIGHT INGUINAL PAIN: Primary | ICD-10-CM

## 2023-08-24 PROCEDURE — 97140 MANUAL THERAPY 1/> REGIONS: CPT | Mod: GP | Performed by: PHYSICAL THERAPIST

## 2023-08-24 PROCEDURE — 97112 NEUROMUSCULAR REEDUCATION: CPT | Mod: GP | Performed by: PHYSICAL THERAPIST

## 2023-08-25 ENCOUNTER — HOSPITAL ENCOUNTER (OUTPATIENT)
Dept: MRI IMAGING | Facility: HOSPITAL | Age: 23
Discharge: HOME | End: 2023-08-25
Attending: NURSE PRACTITIONER
Payer: COMMERCIAL

## 2023-08-25 DIAGNOSIS — G89.29 CHRONIC LEFT SHOULDER PAIN: ICD-10-CM

## 2023-08-25 DIAGNOSIS — M54.10 RADICULOPATHY, UNSPECIFIED SPINAL REGION: ICD-10-CM

## 2023-08-25 DIAGNOSIS — G89.29 CHRONIC INTRACTABLE PAIN: ICD-10-CM

## 2023-08-25 DIAGNOSIS — M25.512 CHRONIC LEFT SHOULDER PAIN: ICD-10-CM

## 2023-08-25 PROCEDURE — 72141 MRI NECK SPINE W/O DYE: CPT

## 2023-08-25 PROCEDURE — 73221 MRI JOINT UPR EXTREM W/O DYE: CPT | Mod: LT

## 2023-09-25 ASSESSMENT — ENCOUNTER SYMPTOMS
HEMATURIA: 0
EYE IRRITATION: 0
BLOATING: 1
NUMBNESS: 1
HEADACHES: 1
JAUNDICE: 0
BOWEL INCONTINENCE: 0
DISTURBANCES IN COORDINATION: 0
ORTHOPNEA: 0
SYNCOPE: 0
NAUSEA: 1
HYPOTENSION: 0
DOUBLE VISION: 0
HEARTBURN: 1
PARALYSIS: 0
CONSTIPATION: 0
NECK PAIN: 1
EYE REDNESS: 0
RECTAL PAIN: 0
EXERCISE INTOLERANCE: 0
NAIL CHANGES: 0
POOR WOUND HEALING: 0
LIGHT-HEADEDNESS: 0
SKIN CHANGES: 0
DIFFICULTY URINATING: 1
BLOOD IN STOOL: 0
MUSCLE CRAMPS: 1
DIARRHEA: 1
PALPITATIONS: 0
ABDOMINAL PAIN: 1
DIZZINESS: 0
LOSS OF CONSCIOUSNESS: 0
WEAKNESS: 1
MUSCLE WEAKNESS: 1
MYALGIAS: 0
DYSURIA: 1
SEIZURES: 0
EYE WATERING: 0
LEG PAIN: 1
HYPERTENSION: 0
FLANK PAIN: 0
EYE PAIN: 0
SPEECH CHANGE: 0
BACK PAIN: 1
STIFFNESS: 0
MEMORY LOSS: 0
JOINT SWELLING: 0
SLEEP DISTURBANCES DUE TO BREATHING: 0
VOMITING: 0
TINGLING: 1
ARTHRALGIAS: 0
TREMORS: 0

## 2023-09-28 ENCOUNTER — OFFICE VISIT (OUTPATIENT)
Dept: PHYSICAL MEDICINE AND REHAB | Facility: CLINIC | Age: 23
End: 2023-09-28
Payer: COMMERCIAL

## 2023-09-28 VITALS
BODY MASS INDEX: 29.51 KG/M2 | DIASTOLIC BLOOD PRESSURE: 86 MMHG | HEART RATE: 62 BPM | WEIGHT: 188 LBS | HEIGHT: 67 IN | SYSTOLIC BLOOD PRESSURE: 137 MMHG

## 2023-09-28 DIAGNOSIS — M54.16 LUMBAR RADICULOPATHY: Primary | ICD-10-CM

## 2023-09-28 PROCEDURE — 99204 OFFICE O/P NEW MOD 45 MIN: CPT | Performed by: NURSE PRACTITIONER

## 2023-09-28 RX ORDER — METHYLPREDNISOLONE 4 MG
TABLET, DOSE PACK ORAL
Qty: 21 TABLET | Refills: 0 | Status: SHIPPED | OUTPATIENT
Start: 2023-09-28

## 2023-09-28 ASSESSMENT — PAIN SCALES - GENERAL: PAINLEVEL: SEVERE PAIN (6)

## 2023-09-28 NOTE — LETTER
9/28/2023         RE: Fernando Mcdonald  [address] [facility] [address]        Dear Colleague,    Thank you for referring your patient, Fernando Mcdonald, to the [facility]. Please see a copy of my visit note below.    ASSESSMENT: Fernando Mcdonald is a 22 year old male who presents for consultation at the request of [facility], who presents today for new patient evaluation of:    -Left lumbar radiculopathy    Patient has sensory loss left S1 distribution, with weakness left EHL and plantarflexion on exam.  His back and left leg pain follows the left S1 distribution as well, and is quite significant.  Given his progressive neurologic decline, I would recommend a new lumbar MRI without contrast for treatment planning.  He has tried over 6 weeks of NSAIDs, muscle relaxers, Tylenol without relief.  We talked about potential for gabapentin, he wishes to defer this option for now.  We will try a brief course of steroids to see if we can relieve inflammation.  He would be a good candidate for lumbar epidural steroid injections, however he is not interested in pursuing this given family experiences.  We will discuss results of MRI once images available and decide plan.    Separately, I did review patient's cervical spine MRI with him as he wondered if that could be the cause of his left leg symptoms.  I reassured him that the findings of his cervical spine MRIs show a mild degree of wear and tear and his leg symptoms would not be related.        9/25/2023    12:46 PM   OSWESTRY DISABILITY INDEX   Count 10   Sum 22   Oswestry Score (%) 44 %            Diagnoses and all orders for this visit:  Lumbar radiculopathy  -     MR Lumbar Spine w/o Contrast; Future  -     methylPREDNISolone (MEDROL DOSEPAK) 4 MG tablet therapy pack; Follow Package Directions  -     Physical Therapy Referral; Future      PLAN:  Reviewed spine anatomy and disease process. Discussed  diagnosis and treatment options with the patient today. A shared decision making model was used.  The patient's values and choices were respected. The following represents what was discussed and decided upon by the provider and the patient.      -DIAGNOSTIC TESTS:  Images were personally reviewed and interpreted and explained to patient today using a spine model.   -- Lumbar MRI without contrast    -PHYSICAL THERAPY:    -I added a referral for physical therapy for his lumbar spine.  He is already going for inguinal issue.  Discussed the importance of core strengthening, ROM, stretching exercises with the patient and how each of these entities is important in decreasing pain.  Explained to the patient that the purpose of physical therapy is to teach the patient a home exercise program.  These exercises need to be performed every day in order to decrease pain and prevent future occurrences of pain.        -MEDICATIONS:    -I sent a Medrol Dosepak given the severity of his pain today.  -We could consider other prescription nonsteroidal anti-inflammatories in the future, however patient has already completed over 6 weeks of consistent NSAID use without improvement  -Okay to continue tizanidine as directed given spasm on exam  Discussed multiple medication options today with patient. Discussed risks, side effects, and proper use of medications. Patient verbalized understanding.    -INTERVENTIONS:    Patient does not wish to pursue any lumbar steroid injections.  Patient would be a good candidate for injections however if future imaging correlates with symptoms.  We could at that time and discuss in more detail risks and benefits, however given his declining neurologic function, this would be for pain and not likely to improve any numbness or weakness.    -PATIENT EDUCATION:  Total time of 40 minutes, on the day of service, spent with the patient, reviewing the chart, placing orders, and documenting.   -Today we also  discussed the pros and cons of the current treatment plan.    -FOLLOW-UP:   We will call with results of MRI    Advised patient to call the [facility] if symptoms worsen, new numbness or weakness develops in the legs, or if they develop new or worsening problems controlling bladder or bowel function.   ______________________________________________________________________    SUBJECTIVE:    HPI:  Fernando Mcdonald  Is a 22 year old male who presents today for new patient evaluation of left lumbar radiculopathy    Fernando reports a 7 to 8-year history of lower back pain without injury.  Typically has pain in his lower back which is worse with activity and lifting, with intermittent significant flares over the years. 1 year ago, his left leg started feeling partially numb.  Then in the last 6 months, symptoms have been significantly worse.  He has severe left lower back pain into the left posterior thigh, calf, and foot.  He has had worsening numbness in his left foot and calf.  He has noticed his left foot is weaker.  Symptoms are worse with standing and walking.  He has tried over-the-counter ibuprofen, Tylenol, Aleve without relief.  He has also tried Zanaflex, which does help somewhat.  Pain is getting to a point where he is concerned that he is not able to care for his family.  Describes it as dull, sharp, achy, tingling, numbness.    He last did physical therapy for his lower back in 2018.  He is currently in physical therapy for a separate inguinal concern.  He has done chiropractic therapy in the past, but it made his symptoms worse.  He is not interested in pursuing any epidural steroid injections given multiple family members with poor chronic effects attributed to history of injections.  He would be open to surgery, but is not interested in a lumbar fusion due to similar reasons.    He denies any bowel or bladder changes.  His last lumbar MRI was in 2017, at which time he had a disc herniation at L5-S1.       -Treatment to Date:     -Medications:  -Ibuprofen  -Aleve  -Tizanidine  -Tylenol    Current Outpatient Medications   Medication     IBUPROFEN PO     methylPREDNISolone (MEDROL DOSEPAK) 4 MG tablet therapy pack     tiZANidine (ZANAFLEX) 2 MG tablet     albuterol (PROAIR HFA/PROVENTIL HFA/VENTOLIN HFA) 108 (90 Base) MCG/ACT inhaler     ALBUTEROL IN     No current facility-administered medications for this visit.       Allergies   Allergen Reactions     Cefzil [Cefprozil] Other (See Comments)     Swelling and itchiness of the thraot       Past Medical History:   Diagnosis Date     Anxiety     Not taking medication. Saw psychiatrist in 2011/2012. No specific diagnosis and no specific treatment as per patient     Concussion 9/25/2012     Low back pain 2014    No back injury     Syncope 9/25/2012        Patient Active Problem List   Diagnosis     Low back pain     Anxiety     Chronic low back pain with bilateral sciatica, unspecified back pain laterality     Right inguinal pain     Methamphetamine use disorder, mild, in sustained remission (H)       Past Surgical History:   Procedure Laterality Date     ABDOMEN SURGERY  1999    Pyloric stenosis as infant        Family History   Problem Relation Age of Onset     Hypertension Mother      Asthma Mother      Thyroid Disease Mother      Breast Cancer No family hx of      Colon Cancer No family hx of      Prostate Cancer No family hx of        Reviewed past medical, surgical, and family history with patient found on new patient intake packet located in EMR Media tab.     SOCIAL HX: Smoker    ROS: Per below. specifically negative for bowel/bladder dysfunction, balance changes, headache, dizziness, foot drop, fevers, chills, appetite changes, nausea/vomiting, unexplained weight loss. Otherwise 13 systems reviewed are negative. Please see the patient's intake questionnaire from today for details.  Answers submitted by the patient for this visit:  Symptoms you have experienced  in the last 30 days (Submitted on 9/25/2023)  General Symptoms: No  Skin Symptoms: Yes  HENT Symptoms: No  EYE SYMPTOMS: Yes  HEART SYMPTOMS: Yes  LUNG SYMPTOMS: No  INTESTINAL SYMPTOMS: Yes  URINARY SYMPTOMS: Yes  REPRODUCTIVE SYMPTOMS: Yes  SKELETAL SYMPTOMS: Yes  BLOOD SYMPTOMS: No  NERVOUS SYSTEM SYMPTOMS: Yes  MENTAL HEALTH SYMPTOMS: No   (Submitted on 9/25/2023)  Changes in hair: No  Changes in moles/birth marks: No  Itching: Yes  Rashes: Yes  Changes in nails: No  Acne: Yes  Change in facial hair: No  Warts: No  Non-healing sores: No  Scarring: No  Flaking of skin: No  Color changes of hands/feet in cold : Yes  Sun sensitivity: No  Skin thickening: No  Please answer the questions below to tell us what conditions you are experiencing: (Submitted on 9/25/2023)  Eye pain: No  Vision loss: No  Dry eyes: No  Watery eyes: No  Eye bulging: No  Double vision: No  Flashing of lights: No  Spots: No  Floaters: Yes  Redness: No  Crossed eyes: No  Tunnel Vision: No  Yellowing of eyes: No  Eye irritation: No  Please answer the questions below to tell us what condition you are experiencing: (Submitted on 9/25/2023)  Chest pain or pressure: Yes  Fast or irregular heartbeat: No  Pain in legs with walking: Yes  Trouble breathing while lying down: No  Fingers or toes appear blue: No  High blood pressure: No  Low blood pressure: No  Fainting: No  Murmurs: Yes  Pacemaker: No  Varicose veins: No  Edema or swelling: No  Wake up at night with shortness of breath: No  Light-headedness: No  Exercise intolerance: No  Please answer the questions below to tell us what conditions you are experiencing: (Submitted on 9/25/2023)  Heart burn or indigestion: Yes  Nausea: Yes  Vomiting: No  Abdominal pain: Yes  Bloating: Yes  Constipation: No  Diarrhea: Yes  Blood in stool: No  Black stools: No  Rectal or Anal pain: No  Fecal incontinence: No  Yellowing of skin or eyes: No  Vomit with blood: No  Change in stools: Yes  Please answer the  "questions below to tell us what condition you are experiencing: (Submitted on 9/25/2023)  Trouble holding urine or incontinence: Yes  Pain or burning: Yes  Trouble starting or stopping: Yes  Increased frequency of urination: Yes  Blood in urine: No  Decreased frequency of urination: No  Frequent nighttime urination: No  Flank pain: No  Difficulty emptying bladder: Yes  Please answer the questions below to tell us what condition you are experiencing: (Submitted on 9/25/2023)  Scrotal pain or swelling: Yes  Erectile dysfunction: No  Penile discharge: No  Genital ulcers: No  Reduced libido: Yes  Please answer the questions below to tell us what condition you are experiencing: (Submitted on 9/25/2023)  Back pain: Yes  Muscle aches: No  Neck pain: Yes  Swollen joints: No  Joint pain: No  Bone pain: No  Muscle cramps: Yes  Muscle weakness: Yes  Joint stiffness: No  Bone fracture: No  Please answer the questions below to tell us what condition you are experiencing: (Submitted on 9/25/2023)  Trouble with coordination: No  Dizziness or trouble with balance: No  Fainting or black-out spells: No  Memory loss: No  Headache: Yes  Seizures: No  Speech problems: No  Tingling: Yes  Tremor: No  Weakness: Yes  Difficulty walking: Yes  Paralysis: No  Numbness: Yes      OBJECTIVE:  /86   Pulse 62   Ht 5' 6.54\" (1.69 m)   Wt 188 lb (85.3 kg)   BMI 29.86 kg/m      PHYSICAL EXAMINATION:    --CONSTITUTIONAL:  Vital signs as above.  No acute distress.  The patient is well nourished and well groomed.  --PSYCHIATRIC:  Appropriate mood and affect. The patient is awake, alert, oriented to person, place, time and answering questions appropriately with clear speech.    --SKIN:  Skin over the face, bilateral lower extremities, and posterior torso is clean, dry, intact without rashes.    --RESPIRATORY: Normal rhythm and effort. No abnormal accessory muscle breathing patterns noted.   --ABDOMINAL:  Non-distended.    --GROSS MOTOR: Gait is " antalgic with left limp. Easily arises from a seated position.  Unable to toe walk on left due to weakness of left leg.  Able however to stand on toes on left with pain.  heel walking and tandem are normal without significant difficulty.      --LOWER EXTREMITY MOTOR TESTING:  Hip flexion: right 5/5, left 5/5  Hip abduction: right 5/5, left 5/5  Hip adduction: right 5/5, left 5/5   Quads: right 5/5, left 5/5  Hamstrings: right 5/5, left 5/5  Dorsiflexion: right 5/5, left 5/5  Plantar flexion: right 5/5, left 4/5    Great toe MTP extension/EHL: right 5/5, left 4+/5    --NEUROLOGICAL:  2/4 patellar and achilles reflexes bilaterally. Sensation to light touch is decreased in the left L5 and S1 distributions.  Otherwise sensation is intact throughout both lower extremities. Babinski is negative. No clonus.    --MUSCULOSKELETAL: Lumbar spine inspection reveals no evidence of deformity. Range of motion is not limited in lumbar flexion, extension, lateral rotation.  Positive lower lumbar point tenderness.  Positive lower left-sided paraspinal musculature tenderness into the gluteal region.     Straight leg raising is positive on left    --HIPS: Full range of motion bilaterally. Negative MARTIN and Negative FADIR bilaterally. Negative hip joint tenderness to palp.    --SACROILIAC JOINT: Distraction maneuver was positive on left. Gaenslen's Test was positive on left. Thigh thrust was positive on left. Sacroiliac Joint Compression Test was positive on left. One finger point test was negative.  Positive bilateral, left greater than right SI joint tenderness to palpation bilaterally. Both sides tested    --VASCULAR:  Bilateral lower extremities are warm without any discoloration.  There is no pitting edema of the bilateral lower extremities.    RESULTS:   Prior medical records from [facility] and [facility]where were reviewed today.    Imaging:   Spine imaging was personally reviewed and interpreted today. The images were  shown to the patient and the findings were explained using a spine model.  EXAM: MR CERVICAL SPINE W/O CONTRAST  LOCATION: [facility]  DATE: 8/25/2023     INDICATION: Neck pain; no complicating feature; No chronic neck pain >= 3 months.  COMPARISON: None.  TECHNIQUE: MRI Cervical Spine without IV contrast.     FINDINGS: Exam is mildly limited by motion artifact on some sequences, particularly the axial T2-weighted sequences. There is straightening of the normal cervical lordosis. Sagittal alignment otherwise appears normal. Normal vertebral body heights. Bone   marrow signal appears unremarkable. No definite spinal cord signal abnormality is identified, although evaluation is limited by artifact. The visualized paraspinous soft tissues appear unremarkable.     Segmental analysis:  Craniovertebral junction/C1-C2: Unremarkable.     C2-C3: Normal disc height. No herniation. Normal facets. No significant spinal canal or neural foraminal stenosis.     C3-C4: Normal disc height. No herniation. Normal facets. No spinal canal or neural foraminal stenosis.     C4-C5: Normal disc height. No herniation. Normal facets. No spinal canal or neural foraminal stenosis.     C5-C6: Normal disc height. Shallow symmetric disc bulge. No herniation. Normal facets. No spinal canal stenosis. There may be minimal/mild left neural foraminal narrowing. The right neural foramen is patent.     C6-C7: Normal disc height. Shallow symmetric disc bulge. No herniation. Normal facets. No spinal canal or neural foraminal stenosis.     C7-T1: Normal disc height. No herniation. Normal facets. No spinal canal or neural foraminal stenosis.                                                                      IMPRESSION:  1. Exam is somewhat limited by motion-related artifacts.  2. Straightening of the normal cervical lordosis. This could be positional.  3. Shallow symmetric disc bulges are seen at a few levels. Otherwise, the  cervical spine appears unremarkable. No significant/high-grade spinal canal or neural foraminal stenosis is identified.      Narrative & Impression   MR LUMBAR SPINE W/O CONTRAST 6/29/2017 10:24 AM     Provided History: Lumbago with sciatica, right side, Other chronic  pain, Lumbago with sciatica, left side     Comparison: Lumbar spine plain film 5/10/2016     Technique: Sagittal T1-weighted, sagittal T2-weighted, sagittal STIR,  sagittal diffusion-weighted, axial T2-weighted, and axial gradient  echo images of the lumbar spine were obtained without the  administration of intravenous contrast.     Findings:   5 lumbar-type vertebrae counting down from the presumed 12th ribs. The  tip the conus medullaris is at L1. The cauda equina nerve roots and  visualized thoracic cord are normal.     Normal alignment of the lumbar vertebrae. Normal lumbar lordosis. No  abnormal vertebral marrow edema. No evidence of spondylolysis.  Paraspinous soft tissues are normal. Disc degeneration at L5-S1 with  loss of disc height and disc desiccation.     On a level by level basis:     T12-L1: No spinal canal or neuroforaminal stenosis.     L1-2: No spinal canal or neuroforaminal stenosis.     L2-3: No spinal canal or neuroforaminal stenosis.     L3-4: No spinal canal or neuroforaminal stenosis.     L4-5:  No spinal canal or neuroforaminal stenosis.     L5-S1: Disc bulge with superimposed left central protrusion. No  evidence of neural impingement. Bilateral facet arthropathy. Mild left  neural foraminal stenosis. No significant spinal canal stenosis.                                                                         Impression: Disc degeneration at L5-S1 with left central protrusion.  Mild left neural foraminal stenosis at L5-S1 without evidence of  neural impingement. No significant spinal canal stenosis.        MD Aym GALLO P-C  [facility]  O. [phone]             Again,  thank you for allowing me to participate in the care of your patient.        Sincerely,        GAUDENCIO Tanner CNP

## 2023-09-28 NOTE — PROGRESS NOTES
ASSESSMENT: Fernando Mcdonald is a 22 year old male who presents for consultation at the request of PCP Clinic - [facility], who presents today for new patient evaluation of:    -Left lumbar radiculopathy    Patient has sensory loss left S1 distribution, with weakness left EHL and plantarflexion on exam.  His back and left leg pain follows the left S1 distribution as well, and is quite significant.  Given his progressive neurologic decline, I would recommend a new lumbar MRI without contrast for treatment planning.  He has tried over 6 weeks of NSAIDs, muscle relaxers, Tylenol without relief.  We talked about potential for gabapentin, he wishes to defer this option for now.  We will try a brief course of steroids to see if we can relieve inflammation.  He would be a good candidate for lumbar epidural steroid injections, however he is not interested in pursuing this given family experiences.  We will discuss results of MRI once images available and decide plan.    Separately, I did review patient's cervical spine MRI with him as he wondered if that could be the cause of his left leg symptoms.  I reassured him that the findings of his cervical spine MRIs show a mild degree of wear and tear and his leg symptoms would not be related.        9/25/2023    12:46 PM   OSWESTRY DISABILITY INDEX   Count 10   Sum 22   Oswestry Score (%) 44 %            Diagnoses and all orders for this visit:  Lumbar radiculopathy  -     MR Lumbar Spine w/o Contrast; Future  -     methylPREDNISolone (MEDROL DOSEPAK) 4 MG tablet therapy pack; Follow Package Directions  -     Physical Therapy Referral; Future      PLAN:  Reviewed spine anatomy and disease process. Discussed diagnosis and treatment options with the patient today. A shared decision making model was used.  The patient's values and choices were respected. The following represents what was discussed and decided upon by the provider and the patient.      -DIAGNOSTIC TESTS:   Images were personally reviewed and interpreted and explained to patient today using a spine model.   -- Lumbar MRI without contrast    -PHYSICAL THERAPY:    -I added a referral for physical therapy for his lumbar spine.  He is already going for inguinal issue.  Discussed the importance of core strengthening, ROM, stretching exercises with the patient and how each of these entities is important in decreasing pain.  Explained to the patient that the purpose of physical therapy is to teach the patient a home exercise program.  These exercises need to be performed every day in order to decrease pain and prevent future occurrences of pain.        -MEDICATIONS:    -I sent a Medrol Dosepak given the severity of his pain today.  -We could consider other prescription nonsteroidal anti-inflammatories in the future, however patient has already completed over 6 weeks of consistent NSAID use without improvement  -Okay to continue tizanidine as directed given spasm on exam  Discussed multiple medication options today with patient. Discussed risks, side effects, and proper use of medications. Patient verbalized understanding.    -INTERVENTIONS:    Patient does not wish to pursue any lumbar steroid injections.  Patient would be a good candidate for injections however if future imaging correlates with symptoms.  We could at that time and discuss in more detail risks and benefits, however given his declining neurologic function, this would be for pain and not likely to improve any numbness or weakness.    -PATIENT EDUCATION:  Total time of 40 minutes, on the day of service, spent with the patient, reviewing the chart, placing orders, and documenting.   -Today we also discussed the pros and cons of the current treatment plan.    -FOLLOW-UP:   We will call with results of MRI    Advised patient to call the [facility] if symptoms worsen, new numbness or weakness develops in the legs, or if they develop new or worsening problems  controlling bladder or bowel function.   ______________________________________________________________________    SUBJECTIVE:    HPI:  Fernando Mcdonald  Is a 22 year old male who presents today for new patient evaluation of left lumbar radiculopathy    Fernando reports a 7 to 8-year history of lower back pain without injury.  Typically has pain in his lower back which is worse with activity and lifting, with intermittent significant flares over the years. 1 year ago, his left leg started feeling partially numb.  Then in the last 6 months, symptoms have been significantly worse.  He has severe left lower back pain into the left posterior thigh, calf, and foot.  He has had worsening numbness in his left foot and calf.  He has noticed his left foot is weaker.  Symptoms are worse with standing and walking.  He has tried over-the-counter ibuprofen, Tylenol, Aleve without relief.  He has also tried Zanaflex, which does help somewhat.  Pain is getting to a point where he is concerned that he is not able to care for his family.  Describes it as dull, sharp, achy, tingling, numbness.    He last did physical therapy for his lower back in 2018.  He is currently in physical therapy for a separate inguinal concern.  He has done chiropractic therapy in the past, but it made his symptoms worse.  He is not interested in pursuing any epidural steroid injections given multiple family members with poor chronic effects attributed to history of injections.  He would be open to surgery, but is not interested in a lumbar fusion due to similar reasons.    He denies any bowel or bladder changes.  His last lumbar MRI was in 2017, at which time he had a disc herniation at L5-S1.      -Treatment to Date:     -Medications:  -Ibuprofen  -Aleve  -Tizanidine  -Tylenol    Current Outpatient Medications   Medication    IBUPROFEN PO    methylPREDNISolone (MEDROL DOSEPAK) 4 MG tablet therapy pack    tiZANidine (ZANAFLEX) 2 MG tablet    albuterol (PROAIR  HFA/PROVENTIL HFA/VENTOLIN HFA) 108 (90 Base) MCG/ACT inhaler    ALBUTEROL IN     No current facility-administered medications for this visit.       Allergies   Allergen Reactions    Cefzil [Cefprozil] Other (See Comments)     Swelling and itchiness of the thraot       Past Medical History:   Diagnosis Date    Anxiety     Not taking medication. Saw psychiatrist in 2011/2012. No specific diagnosis and no specific treatment as per patient    Concussion 9/25/2012    Low back pain 2014    No back injury    Syncope 9/25/2012        Patient Active Problem List   Diagnosis    Low back pain    Anxiety    Chronic low back pain with bilateral sciatica, unspecified back pain laterality    Right inguinal pain    Methamphetamine use disorder, mild, in sustained remission (H)       Past Surgical History:   Procedure Laterality Date    ABDOMEN SURGERY  1999    Pyloric stenosis as infant        Family History   Problem Relation Age of Onset    Hypertension Mother     Asthma Mother     Thyroid Disease Mother     Breast Cancer No family hx of     Colon Cancer No family hx of     Prostate Cancer No family hx of        Reviewed past medical, surgical, and family history with patient found on new patient intake packet located in EMR Media tab.     SOCIAL HX: Smoker    ROS: Per below. specifically negative for bowel/bladder dysfunction, balance changes, headache, dizziness, foot drop, fevers, chills, appetite changes, nausea/vomiting, unexplained weight loss. Otherwise 13 systems reviewed are negative. Please see the patient's intake questionnaire from today for details.  Answers submitted by the patient for this visit:  Symptoms you have experienced in the last 30 days (Submitted on 9/25/2023)  General Symptoms: No  Skin Symptoms: Yes  HENT Symptoms: No  EYE SYMPTOMS: Yes  HEART SYMPTOMS: Yes  LUNG SYMPTOMS: No  INTESTINAL SYMPTOMS: Yes  URINARY SYMPTOMS: Yes  REPRODUCTIVE SYMPTOMS: Yes  SKELETAL SYMPTOMS: Yes  BLOOD SYMPTOMS:  No  NERVOUS SYSTEM SYMPTOMS: Yes  MENTAL HEALTH SYMPTOMS: No   (Submitted on 9/25/2023)  Changes in hair: No  Changes in moles/birth marks: No  Itching: Yes  Rashes: Yes  Changes in nails: No  Acne: Yes  Change in facial hair: No  Warts: No  Non-healing sores: No  Scarring: No  Flaking of skin: No  Color changes of hands/feet in cold : Yes  Sun sensitivity: No  Skin thickening: No  Please answer the questions below to tell us what conditions you are experiencing: (Submitted on 9/25/2023)  Eye pain: No  Vision loss: No  Dry eyes: No  Watery eyes: No  Eye bulging: No  Double vision: No  Flashing of lights: No  Spots: No  Floaters: Yes  Redness: No  Crossed eyes: No  Tunnel Vision: No  Yellowing of eyes: No  Eye irritation: No  Please answer the questions below to tell us what condition you are experiencing: (Submitted on 9/25/2023)  Chest pain or pressure: Yes  Fast or irregular heartbeat: No  Pain in legs with walking: Yes  Trouble breathing while lying down: No  Fingers or toes appear blue: No  High blood pressure: No  Low blood pressure: No  Fainting: No  Murmurs: Yes  Pacemaker: No  Varicose veins: No  Edema or swelling: No  Wake up at night with shortness of breath: No  Light-headedness: No  Exercise intolerance: No  Please answer the questions below to tell us what conditions you are experiencing: (Submitted on 9/25/2023)  Heart burn or indigestion: Yes  Nausea: Yes  Vomiting: No  Abdominal pain: Yes  Bloating: Yes  Constipation: No  Diarrhea: Yes  Blood in stool: No  Black stools: No  Rectal or Anal pain: No  Fecal incontinence: No  Yellowing of skin or eyes: No  Vomit with blood: No  Change in stools: Yes  Please answer the questions below to tell us what condition you are experiencing: (Submitted on 9/25/2023)  Trouble holding urine or incontinence: Yes  Pain or burning: Yes  Trouble starting or stopping: Yes  Increased frequency of urination: Yes  Blood in urine: No  Decreased frequency of urination:  "No  Frequent nighttime urination: No  Flank pain: No  Difficulty emptying bladder: Yes  Please answer the questions below to tell us what condition you are experiencing: (Submitted on 9/25/2023)  Scrotal pain or swelling: Yes  Erectile dysfunction: No  Penile discharge: No  Genital ulcers: No  Reduced libido: Yes  Please answer the questions below to tell us what condition you are experiencing: (Submitted on 9/25/2023)  Back pain: Yes  Muscle aches: No  Neck pain: Yes  Swollen joints: No  Joint pain: No  Bone pain: No  Muscle cramps: Yes  Muscle weakness: Yes  Joint stiffness: No  Bone fracture: No  Please answer the questions below to tell us what condition you are experiencing: (Submitted on 9/25/2023)  Trouble with coordination: No  Dizziness or trouble with balance: No  Fainting or black-out spells: No  Memory loss: No  Headache: Yes  Seizures: No  Speech problems: No  Tingling: Yes  Tremor: No  Weakness: Yes  Difficulty walking: Yes  Paralysis: No  Numbness: Yes      OBJECTIVE:  /86   Pulse 62   Ht 5' 6.54\" (1.69 m)   Wt 188 lb (85.3 kg)   BMI 29.86 kg/m      PHYSICAL EXAMINATION:    --CONSTITUTIONAL:  Vital signs as above.  No acute distress.  The patient is well nourished and well groomed.  --PSYCHIATRIC:  Appropriate mood and affect. The patient is awake, alert, oriented to person, place, time and answering questions appropriately with clear speech.    --SKIN:  Skin over the face, bilateral lower extremities, and posterior torso is clean, dry, intact without rashes.    --RESPIRATORY: Normal rhythm and effort. No abnormal accessory muscle breathing patterns noted.   --ABDOMINAL:  Non-distended.    --GROSS MOTOR: Gait is antalgic with left limp. Easily arises from a seated position.  Unable to toe walk on left due to weakness of left leg.  Able however to stand on toes on left with pain.  heel walking and tandem are normal without significant difficulty.      --LOWER EXTREMITY MOTOR TESTING:  Hip " flexion: right 5/5, left 5/5  Hip abduction: right 5/5, left 5/5  Hip adduction: right 5/5, left 5/5   Quads: right 5/5, left 5/5  Hamstrings: right 5/5, left 5/5  Dorsiflexion: right 5/5, left 5/5  Plantar flexion: right 5/5, left 4/5    Great toe MTP extension/EHL: right 5/5, left 4+/5    --NEUROLOGICAL:  2/4 patellar and achilles reflexes bilaterally. Sensation to light touch is decreased in the left L5 and S1 distributions.  Otherwise sensation is intact throughout both lower extremities. Babinski is negative. No clonus.    --MUSCULOSKELETAL: Lumbar spine inspection reveals no evidence of deformity. Range of motion is not limited in lumbar flexion, extension, lateral rotation.  Positive lower lumbar point tenderness.  Positive lower left-sided paraspinal musculature tenderness into the gluteal region.     Straight leg raising is positive on left    --HIPS: Full range of motion bilaterally. Negative MARTIN and Negative FADIR bilaterally. Negative hip joint tenderness to palp.    --SACROILIAC JOINT: Distraction maneuver was positive on left. Gaenslen's Test was positive on left. Thigh thrust was positive on left. Sacroiliac Joint Compression Test was positive on left. One finger point test was negative.  Positive bilateral, left greater than right SI joint tenderness to palpation bilaterally. Both sides tested    --VASCULAR:  Bilateral lower extremities are warm without any discoloration.  There is no pitting edema of the bilateral lower extremities.    RESULTS:   Prior medical records from [facility] Care Everywhere were reviewed today.    Imaging:   Spine imaging was personally reviewed and interpreted today. The images were shown to the patient and the findings were explained using a spine model.  EXAM: MR CERVICAL SPINE W/O CONTRAST  LOCATION: [facility]  DATE: 8/25/2023     INDICATION: Neck pain; no complicating feature; No chronic neck pain >= 3 months.  COMPARISON:  None.  TECHNIQUE: MRI Cervical Spine without IV contrast.     FINDINGS: Exam is mildly limited by motion artifact on some sequences, particularly the axial T2-weighted sequences. There is straightening of the normal cervical lordosis. Sagittal alignment otherwise appears normal. Normal vertebral body heights. Bone   marrow signal appears unremarkable. No definite spinal cord signal abnormality is identified, although evaluation is limited by artifact. The visualized paraspinous soft tissues appear unremarkable.     Segmental analysis:  Craniovertebral junction/C1-C2: Unremarkable.     C2-C3: Normal disc height. No herniation. Normal facets. No significant spinal canal or neural foraminal stenosis.     C3-C4: Normal disc height. No herniation. Normal facets. No spinal canal or neural foraminal stenosis.     C4-C5: Normal disc height. No herniation. Normal facets. No spinal canal or neural foraminal stenosis.     C5-C6: Normal disc height. Shallow symmetric disc bulge. No herniation. Normal facets. No spinal canal stenosis. There may be minimal/mild left neural foraminal narrowing. The right neural foramen is patent.     C6-C7: Normal disc height. Shallow symmetric disc bulge. No herniation. Normal facets. No spinal canal or neural foraminal stenosis.     C7-T1: Normal disc height. No herniation. Normal facets. No spinal canal or neural foraminal stenosis.                                                                      IMPRESSION:  1. Exam is somewhat limited by motion-related artifacts.  2. Straightening of the normal cervical lordosis. This could be positional.  3. Shallow symmetric disc bulges are seen at a few levels. Otherwise, the cervical spine appears unremarkable. No significant/high-grade spinal canal or neural foraminal stenosis is identified.      Narrative & Impression   MR LUMBAR SPINE W/O CONTRAST 6/29/2017 10:24 AM     Provided History: Lumbago with sciatica, right side, Other chronic  pain,  Lumbago with sciatica, left side     Comparison: Lumbar spine plain film 5/10/2016     Technique: Sagittal T1-weighted, sagittal T2-weighted, sagittal STIR,  sagittal diffusion-weighted, axial T2-weighted, and axial gradient  echo images of the lumbar spine were obtained without the  administration of intravenous contrast.     Findings:   5 lumbar-type vertebrae counting down from the presumed 12th ribs. The  tip the conus medullaris is at L1. The cauda equina nerve roots and  visualized thoracic cord are normal.     Normal alignment of the lumbar vertebrae. Normal lumbar lordosis. No  abnormal vertebral marrow edema. No evidence of spondylolysis.  Paraspinous soft tissues are normal. Disc degeneration at L5-S1 with  loss of disc height and disc desiccation.     On a level by level basis:     T12-L1: No spinal canal or neuroforaminal stenosis.     L1-2: No spinal canal or neuroforaminal stenosis.     L2-3: No spinal canal or neuroforaminal stenosis.     L3-4: No spinal canal or neuroforaminal stenosis.     L4-5:  No spinal canal or neuroforaminal stenosis.     L5-S1: Disc bulge with superimposed left central protrusion. No  evidence of neural impingement. Bilateral facet arthropathy. Mild left  neural foraminal stenosis. No significant spinal canal stenosis.                                                                         Impression: Disc degeneration at L5-S1 with left central protrusion.  Mild left neural foraminal stenosis at L5-S1 without evidence of  neural impingement. No significant spinal canal stenosis.        MD Amy GALLO P-C  [facility]  O. [phone]

## 2023-09-28 NOTE — PATIENT INSTRUCTIONS
"Medrol Dose Pack (Prednisone Taper) has been prescribed today for your acute pain.  Please follow the directions on package.  Do not take with NSAIDs (ibuprofen, aleve, nabumetone, diclofenac).  OK to take with tylenol (extra strength or extended release/8hour) over the counter as needed.      POSSIBLE STEROID SIDE EFFECTS :  -If you experience these, it should only last for a short period-   Change in menstrual flow   Swelling   Increased appetite   Skin redness (flushness)   Skin rash   Mouth (oral) irritation   Blood sugar (glucose) levels   Sweats   Mood changes       Radicular Pain    Radicular pain in either the arm or leg is usually from a bulging disc in the spine. A portion of the herniated disc may press against the nerves as the nerves exit the spine. This causes pain which is felt down the arm or leg. Other causes of radicular pain may include:  Fractures.  Heart disease.  Cancer.  An abnormal and usually degenerative state of the nervous system or nerves (neuropathy).    In most cases, radicular pain is treated without imaging unless symptoms do not start to improve. If that is the case, your provider may order a CT or MRI scan to determine the cause.     Nerves in the cervical spine (neck) may cause radicular pain into the outer shoulder and down the arm. It can spread down to the thumb and fingers. The symptoms vary depending on which nerve root has been affected. In most cases, radicular pain improves with conservative treatment such as physical therapy, cervical traction, medications, and epidural steroid injections. A program for neck rehabilitation with stretching and strengthening exercises is an important part of management. Treatment may take months, and surgery may be considered as a last resort if the symptoms do not improve.    Nerves in the lumbar spine (lower back) may cause radicular pain into the hip and down the leg. The commonly used term for this type of pain is \"sciatica\". " Conservative treatment is also recommended for this problem. Most patients feel better after 2 to 4 weeks of rest and other supportive care. You should avoid bending, lifting, and all other activities which can make your pain worse. Physical therapy, traction, medications, and epidural steroid injections can be good options to help with your recovery. A program for back injury rehabilitation with stretching and strengthening exercises is an important part of management. Surgery may be considered as a last resort if symptoms do not improve with conservative treatment.     You may take over-the-counter or prescription medicines for pain, discomfort, or fever as directed by your caregiver. Muscle relaxants may help by relieving more severe pain and spasm. Neuropathic medication (such as gabapentin, lyrica, or cymbalta) can help decrease your symptoms of nerve pain as well. Cold or massage can also give significant relief. Spinal manipulation is not recommended as it can increase the degree of disc protrusion. We do not recommend taking narcotic medication such as percocet, oxycodone, norco, dilaudid, or others unless pain is severe and not controlled with any other oral options.    Epidural steroid injections are often effective treatment for radicular pain. These injections deliver strong anti-inflammatory medicine to the area directly around the nerve root in the space between your back bones (vertebrae). Your provider can give you more information about steroid injections. These injections are most effective when given within two weeks of the onset of acute pain. You should see your provider for follow up care as recommended.     In most cases, radicular pain is treated without imaging unless symptoms do not start to improve. If that is the case, your provider may order a CT or MRI scan to determine the cause.     SEEK IMMEDIATE MEDICAL CARE IF:  You develop increased pain, weakness, or numbness in your arm or  leg.  You develop difficulty with bladder or bowel control.  You develop abdominal pain.    Document Released: 01/25/2006 Document Revised: 03/11/2013 Document Reviewed: 04/12/2010  Patient Information  2013 77 Pieces.       Imaging (Xray, CT, or MRI) has been ordered today.   Radiology will call you to schedule. Please call below if you do not hear from them in the next couple of days.     [facility] Radiology Scheduling:  Please call [phone] to schedule your image(s) (select option #1).    There are 3 different locations:    [facility]  [address] [place] [address] Imaging - [place]  [address] [facility] [address]   [place] [id number]       ~Please call our [facility] Nurse Navigation line [phone] with any questions or concerns about your treatment plan, if symptoms worsen and you would like to be seen urgently, or if you have any new or worsening numbness, weakness, or problems controlling bladder and bowel function.  ~You are also welcome to contact Amy Funes via Veniti, but please be aware that responses to Veniti message may take 2-3 days due to the high volume of patients seen in clinic.

## 2023-10-08 ENCOUNTER — HOSPITAL ENCOUNTER (OUTPATIENT)
Dept: MRI IMAGING | Facility: HOSPITAL | Age: 23
Discharge: HOME | End: 2023-10-08
Attending: NURSE PRACTITIONER | Admitting: NURSE PRACTITIONER
Payer: COMMERCIAL

## 2023-10-08 DIAGNOSIS — M54.16 LUMBAR RADICULOPATHY: ICD-10-CM

## 2023-10-08 PROCEDURE — 72148 MRI LUMBAR SPINE W/O DYE: CPT

## 2023-10-10 ENCOUNTER — TELEPHONE (OUTPATIENT)
Dept: PHYSICAL MEDICINE AND REHAB | Facility: CLINIC | Age: 23
End: 2023-10-10
Payer: COMMERCIAL

## 2023-10-10 DIAGNOSIS — M54.16 LUMBAR RADICULOPATHY: Primary | ICD-10-CM

## 2023-10-10 NOTE — TELEPHONE ENCOUNTER
----- Message from GAUDENCIO Newberry CNP sent at 10/10/2023  8:25 AM CDT -----  Please let Fernando know that his lumbar MRI results are very reassuring. He has some mild wear and tear of his disc between L5-S1 with a tiny disc bulge causing mild narrowing around the nerves on both sides at this level. The narrowing to my eye looks a little worse on the left, which I think could explain his leg symptoms. Given that the degree of narrowing around the nerve roots is only mild however, I would recommend we do an EMG of the left lower extremity to further evaluate the weakness in his foot. If he is improving on steroids, I would recommend starting physical therapy. We could offer a left TF WOOD at L5-S1 for pain control, but he has expressed wanting to avoid steroid injections.

## 2023-10-10 NOTE — TELEPHONE ENCOUNTER
Call placed to patient with provider's results and recommendations.  Left message to return call.  Also sent MyChart message to patient with results.

## 2023-10-12 NOTE — PROGRESS NOTES
Date:10/13/2023      COMPREHENSIVE PAIN CLINIC FOLLOW UP EVALUATION    Mr. Fernando Mcdonald had initial consult on 8/14/2023 in the [facility] per request of  GAUDENCIO Smalls with regards to his pain.  The patient is a 22 year old male with past medical history of anxiety, concussion, chronic low back pain, chronic neck pain, L) shoulder pain h/o methamphetamine use, daily THC use who presents for evaluation of chronic pain.     Updates since initial consult on 08/14/2023.  [facility] who [facility].  Reviewed cervical, lumbar and R) shoulder MRI today.  He received a script for prednisone through [facility].    Interventions/Injections: none ordered    Progress Notes Reviewed:  09/28/2023 GAUDENCIO Salazar, CNP Neurology - ordered lumbar MRI and medrol dospak. Ordered PT at rehabilitation clinic in [place].    Any hospitalizations/ER/UC visits since last appointment:  no  Any falls/accidents since last appointment:  no      Primary Pain :  Cervical and lumbar spine pain.  Left shoulder pain started 1.5 years ago without a precipitating event.  Pain has progressively worsened.      [facility] has also been [facility] and physical therapy.  Patient has been referred to [facility] on 07/26/2023 by Nicole Alston CNP and for physical therapy on 06/08/2023 by Dr. Kana Welch    Characteristics:  He has numbness or tingling on top of the left shoulder into 4th and 5 fingers which is constant.  He has weakness in L) arm. Pain interferes with ADL's and work.  The patient describes the pain as constant aching.      No changes in pain characteristics since last appointment.    What makes the pain better:  His pain is improved with smoking/vaping recreational THC.  What makes the pain worse:   He reports that the pain is made worse by moving his left arm.  Current pain on 0/10 VAS:   9   Worst pain:    9   Best pain:  5     08/14/2023 initial consult:  He rates  his pain score at 7/10, but it can be as low as 5/10 or as severe as 9/10.     Current Pain Related Medications:  Any medications changes since last appointment:    Opioids:  Muscle relaxants: Tizanidine 2mg TID  Acetaminophen/NSAID: ibuprofen 2-4 tabs prn  Recreational marijuana daily  TCA:  Anticonvulsant:  Antidepressant:  Benzodiazapine:  OTC:  Heat/Ice  Sleep medications:      Therapies discussed on initial consult 08/14/2023:   Physical therapy, Pain Psychology, TENs unit, Grounding Mat, Frequency Specific Micro Current, Anti-inflammatory Lifestyle    Exercise:  He is attending PT in [place] at a rehab center.    Hobbies:    Mental Health:  none          Plan on initial consult 08/14/2023:  A multimodal plan was developed today to treat your pain.  Multimodal analgesia is a strategy that reduces reliance on opioids through the use of non-opioid analgesics and therapies that have different mechanisms of action.      Diagnostics: ordered cervical and L) shoulder MRI        Medications:  No new medications added.    The following OTC pain medications may be helpful, use as directed: Voltaren Gel 1%, CBD products, Capsaicin products, Australian Dream Cream, Epson It, Arnica prodocts, Lidocaine Patch, Solanpas, Biofreeze, Aspercream, Tiger Balm and Lionel Emu cream.  Apply heat or cold PRN.      Therapies:  PHYSICAL THERAPY: Encourage patient to continue working with physical therapy.  Discussed the importance of core strengthening, ROM, stretching exercises with the patient and how each of these entities is important in decreasing pain.  Explained to the patient that the purpose of physical therapy is to teach the patient a home exercise program.  These exercises need to be performed every day in order to decrease pain and prevent future occurrences of pain.        Discussed Acupuncture.    He already has TENs unit.      Follow up: 1-2 weeks to review imaging and make further  recommendations.        ----------------------------------------------------------------------------------------------------------------------------------------------------  History of pain on initial consult 08/14/2023  He would like to concentrate on left shoulder and arm pain today.  He states he has occassional L) neck pain and headaches.   Left shoulder pain started 1.5 years ago without a precipitating event.  Pain has progressively worsened. He has numbness or tingling on top of the left shoulder into 4th and 5 fingers which is constant.  He has weakness in L) arm. Pain interferes with ADL's and work.  The patient describes the pain as constant aching.  He reports that the pain is made worse by moving his left arm.  His pain is improved with smoking/vaping recreational THC.  He rates his pain score at 7/10, but it can be as low as 5/10 or as severe as 9/10.  [facility] and [facility] therapy.  Patient has been referred to [facility] on 07/26/2023 by Nicole Alston CNP and for physical therapy on 06/08/2023 by Dr. Kana Welch. He is a  for [employer].  He has a 15lb weight restriction per PT.  He is attending [facility] once a week for last 2 months. He has vertigo. He was evaluated for his inguinal pain by Urology and findings were unremarkable.  He also has low back pain.       Progress Notes Reviewed:  07/26/2023 GAUDENCIO Joe  07/11/2023 ED - abdominal pain  06/04/2023 ED - cough, HA, SOB  05/09/2023 Dr. Kana Welch - R) groin pain    He denies any new problems with falls or balance, any new numbness or weakness of the arms or legs, any new bowel or bladder incontinence, any night sweats or unexplained fevers, or any sudden or unexpected weight loss.    Fernando Mcdonald has been seen at a pain clinic in the [facility] in 2016..        Patient Supplied Answers To the  Pain Questionnaire      8/14/2023     1:39 PM    [facility] -  Patient Entered Questionnaire/Answers   What number best describes your pain right now:  0 = No pain  to  10 = Worst pain imaginable 7   How would you describe the pain sharp    numbness    dull, aching    throbbing   Which of the following worsen your pain lying down    standing    sitting    walking    exercise    relaxation    coughing / sneezing    urination    bowel movements   Which of the following improve or reduce your pain nothing relieves the pain   What number best describes your average pain for the past week:  0 = No pain  to  10 = Worst pain imaginable 8   What number best describes your LOWEST pain in past 24 hours:  0 = No pain  to  10 = Worst pain imaginable 6   What number best describes your WORST pain in past 24 hours:  0 = No pain  to  10 = Worst pain imaginable 9   When is your pain worst Constant   What non-medicine treatments have you already had for your pain physical therapy    relaxation training    TENS (electrical stimulator)    exercise    other         Current treatments:  Ibuprofen 2-4 tabs PRN  Tizanidine 2mg TID  Recreational marijuana    Previous medication treatments included:  Anti-convulsants:   Muscle relaxors: no  Anti-depressants: no  Acetaminophen/NSAIDs: not helpful  Topicals: Tiger Balm, Chilo Rosario, Icherberth Hot - minimal benefit  Opioids: no    Other treatments have included:  Physical therapy: attending currently  Pain Psychology: no  Chiropractic: for low back pain years ago  Acupuncture: no  TENs Unit: has a TENS unit - not helpful  Injections: no  Surgeries: no spine or joint surgeries    Past Medical History:  Medical history reviewed.   Past Medical History:   Diagnosis Date    Anxiety     Not taking medication. Saw psychiatrist in 2011/2012. No specific diagnosis and no specific treatment as per patient    Concussion 9/25/2012    Low back pain 2014    No back injury    Syncope 9/25/2012      Patient Active Problem List   Diagnosis    Low back pain    Anxiety     Chronic low back pain with bilateral sciatica, unspecified back pain laterality    Right inguinal pain    Methamphetamine use disorder, mild, in sustained remission (H)       Past Surgical History:  Pertinent surgical history reviewed.   Past Surgical History:   Procedure Laterality Date    ABDOMEN SURGERY  1999    Pyloric stenosis as infant         Medications: Pertinent medications reviewed.  Current Outpatient Medications   Medication Sig Dispense Refill    albuterol (PROAIR HFA/PROVENTIL HFA/VENTOLIN HFA) 108 (90 Base) MCG/ACT inhaler Inhale 2 puffs into the lungs every 4 hours as needed for shortness of breath / dyspnea or wheezing 1 Inhaler 0    ALBUTEROL IN 2-4 puffs every 4 hours as needed      IBUPROFEN PO Take 2-4 tablets by mouth as needed for moderate pain      methylPREDNISolone (MEDROL DOSEPAK) 4 MG tablet therapy pack Follow Package Directions 21 tablet 0    tiZANidine (ZANAFLEX) 2 MG tablet Take 1 tablet (2 mg) by mouth 3 times daily as needed for muscle spasms 30 tablet 1       [place] Prescription Monitoring Program reviewed 10/13/2023.  No concern for abuse or misuse of controlled medications based on this report.  02/09/2023 hydrocodone-acet 5/325mg 12 tab sfor 3 days  1 script for opioids in 2023.      Allergies: Pertinent allergies reviewed.     Allergies   Allergen Reactions    Cefzil [Cefprozil] Other (See Comments)     Swelling and itchiness of the thraot       Family History:   family history includes Asthma in his mother; Hypertension in his mother; Thyroid Disease in his mother.    Social History:   He lives in a house with his girlfriend, girlfriends's mom and sister.  He is single.  He works at [employer] as a cook.  He is independent in ADL'.s  He  reports that he has been smoking vaping device. He uses smokeless tobacco. He reports current drug use. Drug: Marijuana. He reports that he does not drink alcohol.  Social History     Social History Narrative    Presently living with biologic  mother, who gave pt up for adoption at birth.  Adoptive birth mother  when he was 4, of pancreatic cancer, adoptive father remarried, and this woman forced him out of the house- he re-connected with biologic mother 2014 and now living there.           Review of Systems:      (Positive responses bolded)  GENERAL: fever/chills, fatigue, general unwell feeling, weight gain/loss.  HEAD/EYES:  headache, dizziness, or vision changes.    EARS/NOSE/THROAT: nosebleeds, hearing loss, sinus infection, earache, tinnitus.  IMMUNE:  allergies, cancer, immune deficiency, or infections.  SKIN:  itching, rash, hives  HEME/Lymphatic: anemia, easy bruising, easy bleeding.  RESPIRATORY: cough, wheezing, or shortness of breath  CARDIOVASCULAR/Circulation: extremity edema, syncope, hypertension, tachycardia, or angina.  GASTROINTESTINAL: abdominal pain, nausea/emesis, diarrhea, constipation, hematochezia, or melena.  ENDOCRINE: diabetes, steroid use, thyroid disease or osteoporosis.  MUSCULOSKELETAL: myalgias joint pain, stiffness, neck pain, back pain, arthritis, or gout. R) inguinal pain  GENITOURINARY: frequency, urgency, dysuria, difficulty voiding, hematuria or incontinence.  NEUROLOGIC: weakness, numbness, paresthesias, seizure, tremor, stroke or memory loss.  PSYCHIATRIC: depression, anxiety, stress, suicidal thoughts or mood swings. Suicide attempt x 1 as teenager.    Physical Exam:  /78   Pulse 89   Wt 86.6 kg (191 lb)   SpO2 98%   BMI 30.33 kg/m      Physical Exam   Constitutional: He is oriented to person, place, and time.  He appears well-developed and well-nourished. He is not in acute distress.   HENT:     Head: Normocephalic and atraumatic.     Eyes: Pupils are equal, round, and reactive to light. EOM are normal. No scleral icterus.   Neurological: He is alert and oriented to person, place, and time. Coordination grossly normal.  Romberg test negative.  Skin: Skin is warm and dry. He is not  diaphoretic.   Psychiatric: He has a normal mood and affect. His behavior is normal. Judgment and thought content normal.  MSK: Gait is normal.             Imaging:  EXAM: MR SHOULDER LEFT WITHOUT CONTRAST  LOCATION: [facility]  DATE: 08/25/2023     INDICATION: Shoulder pain; Cervical radiculopathy without rotator cuff injury; No known automatically detected potential contraindications to MRI.  COMPARISON: None.  TECHNIQUE: Unenhanced.     FINDINGS:     ROTATOR CUFF:  -Supraspinatus: No tendon tear, tendinopathy or fatty atrophy.  -Infraspinatus: No tendon tear, tendinopathy or fatty atrophy.  -Subscapularis: No tendon tear, tendinopathy or fatty atrophy.  -Teres minor: No tendon tear, tendinopathy or fatty atrophy.     CORACOACROMIAL ARCH:  -Morphology: Type II acromion. No subacromial spur. Subacromial and subcoracoid space are normal.   -Bursa: No subacromial or subcoracoid bursitis. The coracoacromial and coracoclavicular ligaments are negative.     ACROMIOCLAVICULAR JOINT:   -Normal.      LONG HEAD OF BICEPS TENDON:   -No tendinopathy or tear. No tenosynovitis or subluxation.     GLENOHUMERAL JOINT:   -Labrum: No labral tear. No paralabral cyst.   -Cartilage: Normal.   -Joint space: No effusion or synovitis.  -Glenohumeral ligaments and capsule: No pericapsular inflammation.     BONES:   -No fracture or concerning marrow replacing lesion.     SOFT TISSUES:   -Normal deltoid muscle bulk. Normal visualized chest wall and axilla.                                                                      IMPRESSION:  1.  No evidence for rotator cuff tendinopathy or tearing.  2.  No evidence for labral tear.  3.  No evidence for fracture or bone marrow signal abnormality.  4.  Exam otherwise negative. No etiology identified to account for the patient's shoulder pain.              EXAM: MR CERVICAL SPINE W/O CONTRAST  LOCATION: [facility]  DATE: 8/25/2023     INDICATION:  Neck pain; no complicating feature; No chronic neck pain >= 3 months.  COMPARISON: None.  TECHNIQUE: MRI Cervical Spine without IV contrast.     FINDINGS: Exam is mildly limited by motion artifact on some sequences, particularly the axial T2-weighted sequences. There is straightening of the normal cervical lordosis. Sagittal alignment otherwise appears normal. Normal vertebral body heights. Bone   marrow signal appears unremarkable. No definite spinal cord signal abnormality is identified, although evaluation is limited by artifact. The visualized paraspinous soft tissues appear unremarkable.     Segmental analysis:  Craniovertebral junction/C1-C2: Unremarkable.     C2-C3: Normal disc height. No herniation. Normal facets. No significant spinal canal or neural foraminal stenosis.     C3-C4: Normal disc height. No herniation. Normal facets. No spinal canal or neural foraminal stenosis.     C4-C5: Normal disc height. No herniation. Normal facets. No spinal canal or neural foraminal stenosis.     C5-C6: Normal disc height. Shallow symmetric disc bulge. No herniation. Normal facets. No spinal canal stenosis. There may be minimal/mild left neural foraminal narrowing. The right neural foramen is patent.     C6-C7: Normal disc height. Shallow symmetric disc bulge. No herniation. Normal facets. No spinal canal or neural foraminal stenosis.     C7-T1: Normal disc height. No herniation. Normal facets. No spinal canal or neural foraminal stenosis.                                                                      IMPRESSION:  1. Exam is somewhat limited by motion-related artifacts.  2. Straightening of the normal cervical lordosis. This could be positional.  3. Shallow symmetric disc bulges are seen at a few levels. Otherwise, the cervical spine appears unremarkable. No significant/high-grade spinal canal or neural foraminal stenosis is identified.        EXAM: MR LUMBAR SPINE W/O CONTRAST  LOCATION: [facility]  [facility]  DATE: 10/8/2023     INDICATION: left lumbar radiculopathy, progressing left leg numbness, left foot weakness.  COMPARISON: 06/29/2017  TECHNIQUE: Routine Lumbar Spine MRI without IV contrast.     FINDINGS:   Vertebral body heights are maintained. Loss of disc height at L5-S1 most significantly. No anterolisthesis or retrolisthesis.     No significant STIR edema within the vertebral column.     Conus terminates normally.     Level specific findings:     L1-L2: No canal stenosis. Mild facet disease. No foraminal narrowing.     L2-L3: No canal stenosis. No significant foraminal narrowing.     L3-L4: No canal stenosis. No significant foraminal narrowing.     L4-L5: No canal stenosis. Mild facet disease. No significant foraminal narrowing.     L5-S1: Broad-based disc bulge without canal stenosis. Mild bilateral foraminal narrowing.                                                                      IMPRESSION:  1.  Mild degenerative changes.        DATE: 06/02/2021  CLINICAL HISTORY:   Patient with left arm numbness.     TECHNIQUE:   Multiplanar, multi-sequence MRI examination of the cervical spine was performed.   The following sequences were obtained: Sagittal T1 weighted, T2 weighted, and STIR and axial T2 gradient echo.     COMPARISON:   None available.     FINDINGS:   There is no acute fracture, traumatic malalignment, or ligamentous injury involving the cervical spine. Vertebral body heights are maintained without evidence of compression deformity. No evidence of pathologic marrow edema or pathologic lesions. No evidence of prevertebral soft tissue edema.     There is mild cervical spondylosis. There is straightening of the alignment of the cervical spine which may relate to patient positioning versus muscle spasm.     The cervical cord is normal in morphology and signal.     C2-3, C3-4, C4-5: No significant central canal stenosis or foraminal narrowing.     C5-6: Moderate to severe left foraminal  narrowing which appears to be secondary to left uncovertebral hypertrophy and posterior endplate osteophytic ridging eccentric to the left. No significant central canal stenosis.     C6-7: At least mild left foraminal narrowing secondary to uncovertebral hypertrophy and mild left facet arthropathy. No significant central canal stenosis.     C7-T1: The disc is normal. There is no facet arthropathy. There is no central canal stenosis. There is no foraminal narrowing.     IMPRESSION:   1. No acute fracture, traumatic malalignment, ligamentous injury, or significant central canal stenosis of the cervical spine.     2. At C5-6, moderate to severe left foraminal narrowing.     3. At C6-7, at least mild left foraminal narrowing.         EMG:  na    Diagnosis:  (M54.41,  G89.29,  M54.42) Chronic low back pain with bilateral sciatica, unspecified back pain laterality  (primary encounter diagnosis)  Comment:   Plan:     (M54.2) Cervicalgia  Comment:   Plan:     (R10.31) Right inguinal pain  Comment:   Plan:     (M25.512,  G89.29) Chronic left shoulder pain  Comment:   Plan:     (G89.29) Chronic intractable pain  Comment:   Plan:         Plan on 10/13/2023:  Hand out on Frequency Specific Microcurrent.    Continue attending PT.  Continue to see Urology for groin pain  [facility].    Follow up as needed.    GAUDENCIO Quintero, RN, CNP, FNP  [facility]        BILLING TIME DOCUMENTATION:   The total TIME spent on this patient on the date of the encounter/appointment was 60 minutes.          Answers submitted by the patient for this visit:  EVERETTE-7 (Submitted on 8/14/2023)  EVERETTE 7 TOTAL SCORE: 0

## 2023-10-13 ENCOUNTER — OFFICE VISIT (OUTPATIENT)
Dept: PALLIATIVE MEDICINE | Facility: OTHER | Age: 23
End: 2023-10-13
Attending: NURSE PRACTITIONER
Payer: COMMERCIAL

## 2023-10-13 ENCOUNTER — TELEPHONE (OUTPATIENT)
Dept: FAMILY MEDICINE | Facility: CLINIC | Age: 23
End: 2023-10-13
Payer: COMMERCIAL

## 2023-10-13 VITALS
BODY MASS INDEX: 30.33 KG/M2 | OXYGEN SATURATION: 98 % | HEART RATE: 89 BPM | WEIGHT: 191 LBS | SYSTOLIC BLOOD PRESSURE: 125 MMHG | DIASTOLIC BLOOD PRESSURE: 78 MMHG

## 2023-10-13 DIAGNOSIS — M54.42 CHRONIC LOW BACK PAIN WITH BILATERAL SCIATICA, UNSPECIFIED BACK PAIN LATERALITY: Primary | ICD-10-CM

## 2023-10-13 DIAGNOSIS — G89.29 CHRONIC INTRACTABLE PAIN: ICD-10-CM

## 2023-10-13 DIAGNOSIS — M54.41 CHRONIC LOW BACK PAIN WITH BILATERAL SCIATICA, UNSPECIFIED BACK PAIN LATERALITY: Primary | ICD-10-CM

## 2023-10-13 DIAGNOSIS — M25.512 CHRONIC LEFT SHOULDER PAIN: ICD-10-CM

## 2023-10-13 DIAGNOSIS — G89.29 CHRONIC LOW BACK PAIN WITH BILATERAL SCIATICA, UNSPECIFIED BACK PAIN LATERALITY: Primary | ICD-10-CM

## 2023-10-13 DIAGNOSIS — G89.29 CHRONIC LEFT SHOULDER PAIN: ICD-10-CM

## 2023-10-13 DIAGNOSIS — R10.31 RIGHT INGUINAL PAIN: ICD-10-CM

## 2023-10-13 DIAGNOSIS — M54.2 CERVICALGIA: ICD-10-CM

## 2023-10-13 PROCEDURE — 99213 OFFICE O/P EST LOW 20 MIN: CPT | Performed by: NURSE PRACTITIONER

## 2023-10-13 ASSESSMENT — PAIN SCALES - GENERAL: PAINLEVEL: EXTREME PAIN (8)

## 2023-10-13 NOTE — PATIENT INSTRUCTIONS
[facility] Number:  [phone]  Call with any questions about your care and for scheduling assistance.   Calls are returned Monday through Friday between 8 AM and 4:30 PM. We usually get back to you within 2 business days depending on the issue/request.    If we are prescribing your medications:  For opioid medication refills, call the clinic or send a [employer] message 7 days in advance.  Please include:  Name of requested medication  Name of the pharmacy.  For non-opioid medications, call your pharmacy directly to request a refill. Please allow 3-4 days to be processed.   Per [place] State Law:  All controlled substance prescriptions must be filled within 30 days of being written.    For those controlled substances allowing refills, pickup must occur within 30 days of last fill.      We believe regular attendance is key to your success in our program!    Any time you are unable to keep your appointment we ask that you call us at least 24 hours in advance to cancel.This will allow us to offer the appointment time to another patient.   Multiple missed appointments may lead to dismissal from the clinic.

## 2023-10-13 NOTE — TELEPHONE ENCOUNTER
Forms/Letter Request    Type of form/letter: Work    Have you been seen for this request: Yes pt states he is a lot of pain and he is hoping that Nicole will do a letter for him to miss work.  It should just state that due to medical reason he is not able to work on 10/13/23.  It needs to be dated today as well    He will make an appt with her after he has his EMG  When is form/letter needed by: asap    How would you like the form/letter returned: [employer]    Patient Notified form requests are processed in 3-5 business days:Yes    Could we send this information to you in [email or web address] or would you prefer to receive a phone call?:   Patient would prefer a phone call   Okay to leave a detailed message?: Yes at Cell number on file:    Telephone Information:   Mobile [phone]

## 2023-10-13 NOTE — LETTER
October 16, 2023      Fernando Mcdonald  [address] [address]        To Whom It May Concern:     Please excuse Fernando Mcdonald 10/13 due to illness.        Sincerely,        GAUDENCIO Joe CNP

## 2023-10-13 NOTE — PROGRESS NOTES
Patient presents to the clinic today for a visit with GAUDENCIO Rush CNP regarding Pain Management.          8/14/2023     1:00 PM 10/13/2023     1:30 PM   PEG Score   PEG Total Score 8.33 7.67       UDS/CSA- na    Medications- na    QUESTIONS:    Tasha HOPKINS [facility] Visit Facilitator

## 2023-10-16 ENCOUNTER — THERAPY VISIT (OUTPATIENT)
Dept: PHYSICAL THERAPY | Facility: CLINIC | Age: 23
End: 2023-10-16
Payer: COMMERCIAL

## 2023-10-16 DIAGNOSIS — R10.31 RIGHT INGUINAL PAIN: Primary | ICD-10-CM

## 2023-10-16 PROCEDURE — 97164 PT RE-EVAL EST PLAN CARE: CPT | Mod: GP | Performed by: PHYSICAL THERAPIST

## 2023-10-16 PROCEDURE — 97110 THERAPEUTIC EXERCISES: CPT | Mod: GP | Performed by: PHYSICAL THERAPIST

## 2023-10-20 NOTE — PROGRESS NOTES
[facility]                                                                                   OUTPATIENT PHYSICAL THERAPY    PLAN OF TREATMENT FOR OUTPATIENT REHABILITATION   Patient's Last Name, First Name, Fernando Hartley YOB: 2000   Provider's Name   [facility]   Medical Record No.  [id number]     Onset Date: 12/08/21  Start of Care Date: 06/08/23     Medical Diagnosis:  Groin/Pelvic pain      PT Treatment Diagnosis:  Pelvic floor dysfunction Plan of Treatment  Frequency/Duration: 1x/wk/ 12 weeks    Certification date from 09/01/23 to 11/20/23         See note for plan of treatment details and functional goals     Lor Choi, PT                         I CERTIFY THE NEED FOR THESE SERVICES FURNISHED UNDER        THIS PLAN OF TREATMENT AND WHILE UNDER MY CARE     (Physician attestation of this document indicates review and certification of the therapy plan).                Referring Provider:  Kana Welch      Initial Assessment  See Epic Evaluation- Start of Care Date: 06/08/23

## 2023-11-01 ENCOUNTER — OFFICE VISIT (OUTPATIENT)
Dept: PHYSICAL MEDICINE AND REHAB | Facility: CLINIC | Age: 23
End: 2023-11-01
Attending: NURSE PRACTITIONER
Payer: COMMERCIAL

## 2023-11-01 DIAGNOSIS — M54.16 LUMBAR RADICULOPATHY: ICD-10-CM

## 2023-11-01 PROCEDURE — 95909 NRV CNDJ TST 5-6 STUDIES: CPT | Performed by: PHYSICAL MEDICINE & REHABILITATION

## 2023-11-01 PROCEDURE — 95886 MUSC TEST DONE W/N TEST COMP: CPT | Mod: LT | Performed by: PHYSICAL MEDICINE & REHABILITATION

## 2023-11-01 NOTE — LETTER
11/1/2023         RE: Fernando Mcdonald  [address] [address]        Dear Colleague,    Thank you for referring your patient, Fernando Mcdonald, to the [facility]. Please see a copy of my visit note below.    [facility]  [address]  [place] [address]  Office: [phone] Fax: [phone]    Electromyography and Nerve Conduction Study Report        Indication: Patient presents at the request of Amy Funes CNP for left lower extremity EMG.  He has low back pain and diffuse anterior posterior left leg pain through the foot with paresthesias and a sense of weakness.  On exam he has decreased sensation to light touch left medial malleolus lateral malleolus and dorsal left foot compared to the right.  He has normal reflexes 2+ patellar and Achilles with downgoing toes bilaterally, and potential slight giveaway weakness of the ankle plantar flexors dorsiflexors and EHL on the left but appears to have 5/5 strength throughout the major muscle groups of the left lower extremity.      Pt Exam Discussion (Communication Barriers):  Electromyography and nerve conduction testing, including associated discomfort, risks, benefits, and alternatives was discussed with the patient prior to the procedure.  No learning/ communication barriers; patient verbalized understanding of procedure.  Informed consent was obtained.           Pt Assessment:  Testing was successfully completed; patient tolerated testing well.       Blood Thinners: None Skin Temperature: Warmed 31.7                   EMG/NCS  results:     Nerve Conduction Studies  Motor Sites      Segment Distal Latency Neg. Amp CV F-Latency F-Estimate Comment   Site  (ms) mV m/s ms ms    Left Fibular (EDB) Motor   Ankle Ankle-EDB 3.7 5.8       Knee Knee-Ankle 10.8 6.6 53      Left Tibial (AH) Motor   Ankle Ankle-AH 3.3 18.7       Knee Knee-Ankle 11.8 12.6 46        Sensory  Sites      Onset Lat Peak Lat Amp CV Comment   Site (ms) (ms)  V m/s    Left Sural Sensory   B-Ankle 2.9 3.5 22 48      H-Reflex Sites      M-Lat H Lat H Neg Amp   Site (ms) (ms) mV   Left Tibial (Soleus) H-Reflex   Pop Fossa 6.0 29.1 3.6   Right Tibial (Soleus) H-Reflex   Pop Fossa 5.4 29.3 7.0     H-Reflex Sites      Lt. H Lat Rt. H Lat L-R H Lat L-R H Neg Amp   Site (ms) (ms) (ms) Norm mV   Tibial (Soleus) H-Reflex   Pop Fossa 29.1 29.3 0.20  < 3.0 3.4       NCS Waveforms:    Motor         Sensory       H-Reflex           Electromyography     Side Muscle Nerve Root Ins Act Fibs Psw Fasc Recrt Dur Amp Poly Comment   Left AntTibialis Dp Br Fibular L4-5 Nml Nml Nml Nml Nml Nml Nml 0    Left Gastroc Tibial S1-2 Nml Nml Nml Nml Nml Nml Nml 0    Left Fibularis Long Sup Br Fibular L5-S1 Nml Nml Nml Nml Nml Nml Nml 0    Left VastusLat Femoral L2-4 Nml Nml Nml Nml Nml Nml Nml 0    Left TensorFascLat SupGluteal L4-5, S1 Nml Nml Nml Nml Nml Nml Nml 0    Left GluteusMax InfGluteal L5-S2 Nml Nml Nml Nml Nml Nml Nml 0        Comment NCS: Normal study  1.  Normal nerve conduction studies left lower extremity.  This includes normal left sural SNAP, peroneal and tibial CMAP's, and symmetric tibial H reflexes.    Comment EMG: Normal study  1.  Normal needle EMG left lower extremity.    Interpretation: Normal study    1. There is no electrodiagnostic evidence of lumbosacral radiculopathy, lumbosacral plexopathy, or focal neuropathy in the left lower extremity.  Specifically, there is no electrodiagnostic evidence of a left L4-S1 radiculopathy.    The testing was completed in its entirety by the physician.      It was our pleasure caring for your patient today, if there any questions or concerns please do not hesitate to contact us.        Again, thank you for allowing me to participate in the care of your patient.        Sincerely,        Dallas Castelan, DO

## 2023-11-01 NOTE — PROGRESS NOTES
Thank you for choosing the [facility] for your EMG testing.    The ordering provider will receive your final EMG results within the next few days.  Please follow up with your provider for the results and further treatment recommendations.

## 2023-11-01 NOTE — PROGRESS NOTES
[facility]  [address] [place] [address] [address]  [place] [address]  Office: [phone] Fax: [phone]    Electromyography and Nerve Conduction Study Report        Indication: Patient presents at the request of Amy Funes CNP for left lower extremity EMG.  He has low back pain and diffuse anterior posterior left leg pain through the foot with paresthesias and a sense of weakness.  On exam he has decreased sensation to light touch left medial malleolus lateral malleolus and dorsal left foot compared to the right.  He has normal reflexes 2+ patellar and Achilles with downgoing toes bilaterally, and potential slight giveaway weakness of the ankle plantar flexors dorsiflexors and EHL on the left but appears to have 5/5 strength throughout the major muscle groups of the left lower extremity.      Pt Exam Discussion (Communication Barriers):  Electromyography and nerve conduction testing, including associated discomfort, risks, benefits, and alternatives was discussed with the patient prior to the procedure.  No learning/ communication barriers; patient verbalized understanding of procedure.  Informed consent was obtained.           Pt Assessment:  Testing was successfully completed; patient tolerated testing well.       Blood Thinners: None Skin Temperature: Warmed 31.7                   EMG/NCS  results:     Nerve Conduction Studies  Motor Sites      Segment Distal Latency Neg. Amp CV F-Latency F-Estimate Comment   Site  (ms) mV m/s ms ms    Left Fibular (EDB) Motor   Ankle Ankle-EDB 3.7 5.8       Knee Knee-Ankle 10.8 6.6 53      Left Tibial (AH) Motor   Ankle Ankle-AH 3.3 18.7       Knee Knee-Ankle 11.8 12.6 46        Sensory Sites      Onset Lat Peak Lat Amp CV Comment   Site (ms) (ms)  V m/s    Left Sural Sensory   B-Ankle 2.9 3.5 22 48      H-Reflex Sites      M-Lat H Lat H Neg Amp   Site (ms) (ms) mV   Left Tibial (Soleus) H-Reflex   Pop Fossa 6.0 29.1 3.6   Right Tibial (Soleus)  H-Reflex   Pop Fossa 5.4 29.3 7.0     H-Reflex Sites      Lt. H Lat Rt. H Lat L-R H Lat L-R H Neg Amp   Site (ms) (ms) (ms) Norm mV   Tibial (Soleus) H-Reflex   Pop Fossa 29.1 29.3 0.20  < 3.0 3.4       NCS Waveforms:    Motor         Sensory       H-Reflex           Electromyography     Side Muscle Nerve Root Ins Act Fibs Psw Fasc Recrt Dur Amp Poly Comment   Left AntTibialis Dp Br Fibular L4-5 Nml Nml Nml Nml Nml Nml Nml 0    Left Gastroc Tibial S1-2 Nml Nml Nml Nml Nml Nml Nml 0    Left Fibularis Long Sup Br Fibular L5-S1 Nml Nml Nml Nml Nml Nml Nml 0    Left VastusLat Femoral L2-4 Nml Nml Nml Nml Nml Nml Nml 0    Left TensorFascLat SupGluteal L4-5, S1 Nml Nml Nml Nml Nml Nml Nml 0    Left GluteusMax InfGluteal L5-S2 Nml Nml Nml Nml Nml Nml Nml 0        Comment NCS: Normal study  1.  Normal nerve conduction studies left lower extremity.  This includes normal left sural SNAP, peroneal and tibial CMAP's, and symmetric tibial H reflexes.    Comment EMG: Normal study  1.  Normal needle EMG left lower extremity.    Interpretation: Normal study    1. There is no electrodiagnostic evidence of lumbosacral radiculopathy, lumbosacral plexopathy, or focal neuropathy in the left lower extremity.  Specifically, there is no electrodiagnostic evidence of a left L4-S1 radiculopathy.    The testing was completed in its entirety by the physician.      It was our pleasure caring for your patient today, if there any questions or concerns please do not hesitate to contact us.

## 2023-11-07 ENCOUNTER — THERAPY VISIT (OUTPATIENT)
Dept: PHYSICAL THERAPY | Facility: CLINIC | Age: 23
End: 2023-11-07
Payer: COMMERCIAL

## 2023-11-07 ENCOUNTER — TELEPHONE (OUTPATIENT)
Dept: PHYSICAL MEDICINE AND REHAB | Facility: CLINIC | Age: 23
End: 2023-11-07

## 2023-11-07 DIAGNOSIS — R10.31 RIGHT INGUINAL PAIN: Primary | ICD-10-CM

## 2023-11-07 PROCEDURE — 97112 NEUROMUSCULAR REEDUCATION: CPT | Mod: GP | Performed by: PHYSICAL THERAPIST

## 2023-11-07 PROCEDURE — 97110 THERAPEUTIC EXERCISES: CPT | Mod: GP | Performed by: PHYSICAL THERAPIST

## 2023-11-07 NOTE — TELEPHONE ENCOUNTER
My chart message sent with results and recommendations per note from PSP below.    ----- Message from GAUDENCIO Newberry CNP sent at 11/7/2023  7:38 AM CST -----  Please let Fernando know that his EMG of his leg was normal. This is great news. It does not look like there has been any damage to the nerve. I would recommend he continue PT. We can consider a left TF WOOD at L5-S1 if symptoms do not improve.

## 2023-11-14 ENCOUNTER — THERAPY VISIT (OUTPATIENT)
Dept: PHYSICAL THERAPY | Facility: CLINIC | Age: 23
End: 2023-11-14
Payer: COMMERCIAL

## 2023-11-14 DIAGNOSIS — R10.31 RIGHT INGUINAL PAIN: Primary | ICD-10-CM

## 2023-11-14 PROCEDURE — 97110 THERAPEUTIC EXERCISES: CPT | Mod: GP | Performed by: PHYSICAL THERAPIST

## 2024-09-14 ENCOUNTER — HEALTH MAINTENANCE LETTER (OUTPATIENT)
Age: 24
End: 2024-09-14

## 2024-11-07 PROBLEM — R10.31 RIGHT INGUINAL PAIN: Status: RESOLVED | Noted: 2023-06-23 | Resolved: 2024-11-07

## 2024-12-17 ENCOUNTER — OFFICE VISIT (OUTPATIENT)
Dept: FAMILY MEDICINE | Facility: CLINIC | Age: 24
End: 2024-12-17

## 2024-12-17 ENCOUNTER — ANCILLARY PROCEDURE (OUTPATIENT)
Dept: GENERAL RADIOLOGY | Facility: CLINIC | Age: 24
End: 2024-12-17
Attending: STUDENT IN AN ORGANIZED HEALTH CARE EDUCATION/TRAINING PROGRAM

## 2024-12-17 VITALS
SYSTOLIC BLOOD PRESSURE: 134 MMHG | HEIGHT: 67 IN | WEIGHT: 201 LBS | OXYGEN SATURATION: 98 % | BODY MASS INDEX: 31.55 KG/M2 | TEMPERATURE: 98.5 F | RESPIRATION RATE: 18 BRPM | HEART RATE: 70 BPM | DIASTOLIC BLOOD PRESSURE: 84 MMHG

## 2024-12-17 DIAGNOSIS — R32 URINARY INCONTINENCE, UNSPECIFIED TYPE: ICD-10-CM

## 2024-12-17 DIAGNOSIS — R10.31 SEVERE RIGHT GROIN PAIN: ICD-10-CM

## 2024-12-17 DIAGNOSIS — R10.31 SEVERE RIGHT GROIN PAIN: Primary | ICD-10-CM

## 2024-12-17 DIAGNOSIS — M54.16 LUMBAR RADICULOPATHY: ICD-10-CM

## 2024-12-17 PROCEDURE — 73522 X-RAY EXAM HIPS BI 3-4 VIEWS: CPT | Mod: TC | Performed by: RADIOLOGY

## 2024-12-17 RX ORDER — METHYLPREDNISOLONE 4 MG/1
TABLET ORAL
Qty: 21 TABLET | Refills: 0 | Status: SHIPPED | OUTPATIENT
Start: 2024-12-17

## 2024-12-17 NOTE — Clinical Note
Hello team, Referring this patient from [facility] for urgent evaluation/imaging of radiculopathy and urinary incontinence, needs MR lumbar spine without contrast. Patient seen in the ED 2 days ago but did not receive MRI at the time and patient does not want to go back to ED for expedited evaluation but is agreeable with ADS referral.  Thank you! Saul Sethi MD Family Medicine  [facility] [phone]

## 2024-12-17 NOTE — PROGRESS NOTES
[facility] Note    Fernando Mcdonald is a 24 year old male here for ED follow-up and groin pain.    Assessment & Plan     Severe right groin pain  23yo patient presents with acute on chronic right groin pain.  Evaluated in the ED yesterday with CT and US, no source of pain identified   Chronic issue for a few years now, evaluated by urology in 5/2023 thought to be muscle injury   Treated by pain clinic and physical therapy in 2023 with no improvement in symptoms   Hip xray today ruled out bony etiology   Based on history and exam, suspect referred nerve pain vs other somatic vs psychosomatic pain  Refer to detailed plan below  - XR Pelvis and Hip Bilateral 2 Views    Lumbar radiculopathy  Urinary incontinence, unspecified type  Acute on chronic issue. Recently with new onset urinary incontinence past 2-3 months.  No prior history of urinary issues in setting of chronic radicular leg pain and sensory abnormalities.  Given chronicity of symptoms and recent ED visit yesterday, will suggest urgent outpatient workup and referral  Recommend the following:  Expedited evaluation with MRI imaging via ADS tomorrow   If no ADS availability will refer patient to ED for MRI imaging   Urgent referral to neurosurgery for evaluation and follow-up with spine team   Short course of oral steroid for symptom relief and inflammation, refer to orders   Side effects of medication discussed.    Patient reports verbal understanding.   Counseled the patient to follow-up with PCP, return immediately for any new or concerning symptoms, or go to the ED/call 911 with any acute life-threatening concerns.  After shared decision making, patient agreeable with plan   - Adult Neurosurgery  Referral; Future  - methylPREDNISolone (MEDROL DOSEPAK) 4 MG tablet therapy pack; Follow Package Directions  - Referral to Acute and Diagnostic Services (Day of diagnostic / First order acute); Future     Referral to Acute and  Diagnostic Services    [phone] ([place]) [place] [address] [place] [address]  Transition to Acute & Diagnostic Services Clinic has been discussed with patient, and he agrees with next level of care.   Patient understands that evaluation/treatment at [facility] typically takes significantly longer than in clinic/urgent care (>2 hours).  The [facility] has been contacted by provider/staff to confirm patient acceptance.         Special issues:    None             The following provider has assessed this patient for intervention at [facility], and directed the patient for referral: Saul Sethi MD              Shared decision making done during visit, options, concerns, and plan reviewed with the patient. All relevant questions and concerns have been addressed. The patient expressed understanding and is agreeable with the above plan as discussed. AVS provided to patient during office visit via hardcopy and/or MyChart.    Follow-up: Return if symptoms worsen or fail to improve.    Saul Sethi MD 12/17/2024 6:24 PM    Note to patient: The 21st Century Cures Act makes medical notes like this available to patients in the interest of transparency. However, be advised this is a medical document. It is intended as [id number]-[id number] communication. It is written in medical language and may contain abbreviations or verbiage that are unfamiliar. It may appear blunt or direct. Medical documents are intended to carry relevant information, facts as evident, and the clinical opinion of the practitioner.        Mikayla King is a 24 year old, presenting for the following health issues:  ER F/U        12/17/2024     3:03 PM   Additional Questions   Roomed by kelly correa ma   Accompanied by self         12/17/2024     3:03 PM   Patient Reported Additional Medications   Patient reports taking the following new medications none     HPI     ED/UC Followup:    Facility:  [facility]  Date of  "visit: 12/17/24  Reason for visit: groin and abdominal pain  Current Status: has not been resolved    Per ED discharge note from 12/16/2024:  Fernando Mcdonald is a 24 y.o. male who presents to the emergency department for evaluation of abdominal pain. The patient reports one year of right groin pain, which worsened on 12/14, stating of intermittent sharp pains, nausea from the pain, and \"stiffness in the cords around the area\". He additionally reports of night time incontinence. He notes of constant back pain which is not new. He was seen for a cyst around the area but reports to have been told that it was not the reason for his pain. Patient has tried Ibuprofen and Tylenol with no relief. He reports smoking marijuana and notes of only little improvement. He denies any abnormal urine, vomiting, fever, discharge, concern for STDs, testicular size changes, new back pain, discharge, and history of kidney stones.     Acute on chronic right groin pain  Chronic pain is \"pulling\" pain  New pain described as \"hot knife\" stabbing pain  Rated 6-7 out of 10 pain up to a 9 at worst   Pain has improved since the worst    Associated symptoms:  Urinary incontinence started 2-3 months ago  Only at night time when asleep  No prior history of incontinence issues   Occurring almost every night  Chronic ongoing numbness in groin and legs  Chronic ongoing weakness in legs, R>L    Review of Systems:  Please refer to HPI for pertinent positives and negatives.         Objective    /84 (BP Location: Right arm, Patient Position: Chair, Cuff Size: Adult Regular)   Pulse 70   Temp 98.5  F (36.9  C) (Oral)   Resp 18   Ht 1.702 m (5' 7\")   Wt 91.2 kg (201 lb)   SpO2 98%   BMI 31.48 kg/m    Body mass index is 31.48 kg/m .    Physical Exam  Vitals reviewed.   Constitutional:       General: He is in acute distress.      Appearance: Normal appearance.      Comments: Patient appears in pain with movement. Otherwise very pleasant.    HENT: "      Head: Normocephalic and atraumatic.   Eyes:      Conjunctiva/sclera: Conjunctivae normal.   Cardiovascular:      Rate and Rhythm: Normal rate.   Pulmonary:      Effort: No respiratory distress.   Musculoskeletal:      Cervical back: Normal range of motion. No tenderness.      Right lower leg: No edema.      Left lower leg: No edema.   Skin:     General: Skin is warm and dry.   Neurological:      Mental Status: He is alert.      Sensory: Sensory deficit present.      Motor: Weakness present.      Comments: Right leg with decreased light touch sensation throughout compared to left leg. Right hip flexion and extension decreased.   Psychiatric:         Mood and Affect: Mood normal.         Behavior: Behavior normal.           Imaging reviewed per chart review:  CT ABDOMEN PELVIS STONE PROTOCOL WO    Result Date: 12/17/2024  For Patients: As a result of the 21st Century Cures Act, medical imaging exams and procedure reports are released immediately into your electronic medical record. You may view this report before your referring provider. If you have questions, please contact your health care provider. EXAM: CT ABDOMEN PELVIS STONE PROTOCOL WO LOCATION: [facility] DATE: 12/17/2024 INDICATION: Abdominal/flank pain, stone suspected COMPARISON: 7/11/23, 5/10/23 TECHNIQUE: CT scan of the abdomen and pelvis was performed without oral or IV contrast. Multiplanar reformats were obtained. Dose reduction techniques were used. CONTRAST: None. FINDINGS: LOWER CHEST: Normal. HEPATOBILIARY: No significant mass or bile duct dilatation. No calcified gallstones. PANCREAS: No significant mass, duct dilatation, or inflammatory change. SPLEEN: Normal size. ADRENAL GLANDS: No significant nodules. KIDNEY/BLADDER: No significant mass, stone, or hydronephrosis. BOWEL: Normal with no obstruction or acute inflammatory change. Nothing for appendicitis. LYMPH NODES: No lymphadenopathy. VASCULATURE: No abdominal aortic aneurysm. PELVIC  ORGANS: No pelvic masses. MUSCULOSKELETAL: Unremarkable.     1. No acute intra-abdominal process. 2. No renal, ureteral, or bladder calculi no hydronephrosis or hydroureter 3. Normal appendix      US SCROTUM W DUPLEX    Result Date: 12/16/2024  For Patients: As a result of the 21st Century Cures Act, medical imaging exams and procedure reports are released immediately into your electronic medical record. You may view this report before your referring provider. If you have questions, please contact your health care provider. EXAM: US SCROTUM W DUPLEX LOCATION: [facility] DATE: 12/16/2024 INDICATION: Pain/Swelling COMPARISON: None. TECHNIQUE: Ultrasound of scrotum with color flow and spectral Doppler with waveform analysis performed. FINDINGS: RIGHT: Right testicle measures 5.2 x 3.2 x 3.4 cm. Homogeneous echotexture. Flow documented. Several 2 mm epididymal cysts. Trace hydrocele. No varicocele. LEFT: Left testicle measures 5.4 x 3.1 x 4.1 cm. Homogeneous echotexture. Flow documented. 6 mm epididymal cyst. Trace hydrocele. No varicocele.     1. Trace, bilateral hydrocele. 2. Flow documented bilaterally.       Signed Electronically by: Saul Sethi MD

## 2024-12-18 ENCOUNTER — TELEPHONE (OUTPATIENT)
Dept: FAMILY MEDICINE | Facility: CLINIC | Age: 24
End: 2024-12-18

## 2024-12-18 NOTE — TELEPHONE ENCOUNTER
Called patient and left a voicemail to call [facility] back. Patient to be seen today at [facility], at 1pm, no later. Pending pt to call back. (First attempt at 10:34 am)    Second attempt at reaching patient, called at 11:05 am. LVM.    Third attempt at reaching patient, called at 12:23 pm, LVM.    WILL Beltre  [facility]

## 2024-12-18 NOTE — PATIENT INSTRUCTIONS
Yovani King,    Thank you for allowing [facility] to manage your care.    I ordered some xrays, please go to our radiology department to get your xrays.    I sent your prescriptions to your pharmacy. Take oral steroid as prescribed.     I ordered a lumbar MRI for tomorrow, plan to be called tomorrow to schedule imaging.     I made a referral, they will be calling in approximately 1 week to set up your appointment. If you do not hear from them, please call the specialty number on your after visit.     If you have any questions or concerns, please feel free to call us at [phone].      Your partner in health,    Dr. Sethi    Did you know?      You can schedule a video visit for follow-up appointments as well as future appointments for certain conditions.  Please see the below link.     [email or web address]    If you have not already done so,  I encourage you to sign up for Mychart ([email or web address]).  This will allow you to review your results, securely communicate with a provider, and schedule virtual visits as well.

## 2024-12-18 NOTE — PROGRESS NOTES
Spoke with Dr Perlita Decker regarding this referral.  Our MRI slot is already taken today.  Spoke with [place] [facility] and they can see pt in [facility] today.  [facility] is reaching out to pt to arrange AD[facility] visit today.  Tasha Donis RN

## 2025-08-07 ENCOUNTER — OFFICE VISIT (OUTPATIENT)
Dept: FAMILY MEDICINE | Facility: CLINIC | Age: 25
End: 2025-08-07
Payer: COMMERCIAL

## 2025-08-07 VITALS
BODY MASS INDEX: 30.67 KG/M2 | HEIGHT: 67 IN | SYSTOLIC BLOOD PRESSURE: 136 MMHG | OXYGEN SATURATION: 100 % | RESPIRATION RATE: 18 BRPM | HEART RATE: 116 BPM | DIASTOLIC BLOOD PRESSURE: 80 MMHG | TEMPERATURE: 97.9 F | WEIGHT: 195.4 LBS

## 2025-08-07 DIAGNOSIS — K52.9 CHRONIC DIARRHEA: ICD-10-CM

## 2025-08-07 DIAGNOSIS — M54.50 LUMBAR PAIN WITH RADIATION DOWN BOTH LEGS: ICD-10-CM

## 2025-08-07 DIAGNOSIS — M79.604 LUMBAR PAIN WITH RADIATION DOWN BOTH LEGS: ICD-10-CM

## 2025-08-07 DIAGNOSIS — R30.0 DYSURIA: Primary | ICD-10-CM

## 2025-08-07 DIAGNOSIS — M79.605 LUMBAR PAIN WITH RADIATION DOWN BOTH LEGS: ICD-10-CM

## 2025-08-07 DIAGNOSIS — R10.31 RLQ ABDOMINAL PAIN: ICD-10-CM

## 2025-08-07 LAB
ALBUMIN UR-MCNC: ABNORMAL MG/DL
AMPHETAMINES UR QL: DETECTED
APPEARANCE UR: CLEAR
BARBITURATES UR QL SCN: NOT DETECTED
BENZODIAZ UR QL SCN: NOT DETECTED
BILIRUB UR QL STRIP: NEGATIVE
BUPRENORPHINE UR QL: NOT DETECTED
CANNABINOIDS UR QL: DETECTED
COCAINE UR QL SCN: NOT DETECTED
COLOR UR AUTO: YELLOW
D-METHAMPHET UR QL: DETECTED
ERYTHROCYTE [DISTWIDTH] IN BLOOD BY AUTOMATED COUNT: 12.7 % (ref 10–15)
GLUCOSE UR STRIP-MCNC: NEGATIVE MG/DL
HCT VFR BLD AUTO: 48 % (ref 35–53)
HGB BLD-MCNC: 16.3 G/DL (ref 11.7–17.7)
HGB UR QL STRIP: NEGATIVE
KETONES UR STRIP-MCNC: NEGATIVE MG/DL
LEUKOCYTE ESTERASE UR QL STRIP: NEGATIVE
MCH RBC QN AUTO: 31.1 PG (ref 26.5–33)
MCHC RBC AUTO-ENTMCNC: 34 G/DL (ref 31.5–36.5)
MCV RBC AUTO: 92 FL (ref 78–100)
METHADONE UR QL SCN: NOT DETECTED
MUCOUS THREADS #/AREA URNS LPF: PRESENT /LPF
NITRATE UR QL: NEGATIVE
OPIATES UR QL SCN: NOT DETECTED
OXYCODONE UR QL SCN: NOT DETECTED
PCP UR QL SCN: NOT DETECTED
PH UR STRIP: 5.5 [PH] (ref 5–7)
PLATELET # BLD AUTO: 393 10E3/UL (ref 150–450)
RBC # BLD AUTO: 5.24 10E6/UL (ref 3.8–5.9)
RBC #/AREA URNS AUTO: ABNORMAL /HPF
SP GR UR STRIP: >=1.03 (ref 1–1.03)
TRICYCLICS UR QL SCN: NOT DETECTED
UROBILINOGEN UR STRIP-ACNC: 0.2 E.U./DL
WBC # BLD AUTO: 7.6 10E3/UL (ref 4–11)
WBC #/AREA URNS AUTO: ABNORMAL /HPF

## 2025-08-11 LAB
ALMOND IGE QN: <0.1 KU(A)/L
CASHEW NUT IGE QN: <0.1 KU(A)/L
CODFISH IGE QN: <0.1 KU(A)/L
COW MILK IGE QN: <0.1 KU(A)/L
EGG WHITE IGE QN: <0.1 KU(A)/L
HAZELNUT IGE QN: <0.1 KU(A)/L
IGE SERPL-ACNC: 42 KU/L (ref 0–114)
PEANUT IGE QN: <0.1 KU(A)/L
SALMON IGE QN: <0.1 KU(A)/L
SCALLOP IGE QN: <0.1 KU(A)/L
SESAME SEED IGE QN: <0.1 KU(A)/L
SHRIMP IGE QN: <0.1 KU(A)/L
SOYBEAN IGE QN: <0.1 KU(A)/L
TUNA IGE QN: <0.1 KU(A)/L
WALNUT IGE QN: <0.1 KU(A)/L
WHEAT IGE QN: <0.1 KU(A)/L

## 2025-08-18 ENCOUNTER — APPOINTMENT (OUTPATIENT)
Dept: CT IMAGING | Facility: HOSPITAL | Age: 25
End: 2025-08-18
Attending: EMERGENCY MEDICINE
Payer: COMMERCIAL

## 2025-08-18 ENCOUNTER — HOSPITAL ENCOUNTER (EMERGENCY)
Facility: HOSPITAL | Age: 25
Discharge: HOME | End: 2025-08-19
Attending: EMERGENCY MEDICINE
Payer: COMMERCIAL

## 2025-08-18 DIAGNOSIS — R10.9 RIGHT SIDED ABDOMINAL PAIN: Primary | ICD-10-CM

## 2025-08-18 LAB
ALBUMIN SERPL BCG-MCNC: 4.7 G/DL (ref 3.5–5.2)
ALBUMIN UR-MCNC: 10 MG/DL
ALP SERPL-CCNC: 73 U/L (ref 40–150)
ALT SERPL W P-5'-P-CCNC: 37 U/L (ref 0–70)
ANION GAP SERPL CALCULATED.3IONS-SCNC: 14 MMOL/L (ref 7–15)
APPEARANCE UR: CLEAR
AST SERPL W P-5'-P-CCNC: 22 U/L (ref 0–45)
BASOPHILS # BLD AUTO: 0.04 10E3/UL (ref 0–0.2)
BASOPHILS NFR BLD AUTO: 0.5 %
BILIRUB SERPL-MCNC: 0.6 MG/DL
BILIRUB UR QL STRIP: NEGATIVE
BUN SERPL-MCNC: 11.5 MG/DL (ref 6–20)
CALCIUM SERPL-MCNC: 10.2 MG/DL (ref 8.8–10.4)
CHLORIDE SERPL-SCNC: 104 MMOL/L (ref 98–107)
COLOR UR AUTO: YELLOW
CREAT SERPL-MCNC: 0.77 MG/DL (ref 0.51–1.17)
EGFRCR SERPLBLD CKD-EPI 2021: >90 ML/MIN/1.73M2
EOSINOPHIL # BLD AUTO: 0.2 10E3/UL (ref 0–0.7)
EOSINOPHIL NFR BLD AUTO: 2.3 %
ERYTHROCYTE [DISTWIDTH] IN BLOOD BY AUTOMATED COUNT: 12.6 % (ref 10–15)
GLUCOSE SERPL-MCNC: 96 MG/DL (ref 70–99)
GLUCOSE UR STRIP-MCNC: NEGATIVE MG/DL
HCO3 SERPL-SCNC: 23 MMOL/L (ref 22–29)
HCT VFR BLD AUTO: 44.1 % (ref 35–53)
HGB BLD-MCNC: 15.5 G/DL (ref 11.7–17.7)
HGB UR QL STRIP: NEGATIVE
IMM GRANULOCYTES # BLD: <0.03 10E3/UL
IMM GRANULOCYTES NFR BLD: 0.1 %
KETONES UR STRIP-MCNC: NEGATIVE MG/DL
LEUKOCYTE ESTERASE UR QL STRIP: NEGATIVE
LIPASE SERPL-CCNC: 21 U/L (ref 13–60)
LYMPHOCYTES # BLD AUTO: 3.44 10E3/UL (ref 0.8–5.3)
LYMPHOCYTES NFR BLD AUTO: 39.7 %
MCH RBC QN AUTO: 31.4 PG (ref 26.5–33)
MCHC RBC AUTO-ENTMCNC: 35.1 G/DL (ref 31.5–36.5)
MCV RBC AUTO: 89.5 FL (ref 78–100)
MONOCYTES # BLD AUTO: 0.52 10E3/UL (ref 0–1.3)
MONOCYTES NFR BLD AUTO: 6 %
MUCOUS THREADS #/AREA URNS LPF: PRESENT /LPF
NEUTROPHILS # BLD AUTO: 4.46 10E3/UL (ref 1.6–8.3)
NEUTROPHILS NFR BLD AUTO: 51.4 %
NITRATE UR QL: NEGATIVE
NRBC # BLD AUTO: <0.03 10E3/UL
NRBC BLD AUTO-RTO: 0 /100
PH UR STRIP: 5.5 [PH] (ref 5–7)
PLATELET # BLD AUTO: 380 10E3/UL (ref 150–450)
POTASSIUM SERPL-SCNC: 4.5 MMOL/L (ref 3.4–5.3)
PROT SERPL-MCNC: 7.3 G/DL (ref 6.4–8.3)
RBC # BLD AUTO: 4.93 10E6/UL (ref 3.8–5.9)
RBC URINE: 0 /HPF
SODIUM SERPL-SCNC: 141 MMOL/L (ref 135–145)
SP GR UR STRIP: 1.03 (ref 1–1.03)
UROBILINOGEN UR STRIP-MCNC: NORMAL MG/DL
WBC # BLD AUTO: 8.67 10E3/UL (ref 4–11)
WBC URINE: 4 /HPF

## 2025-08-18 PROCEDURE — 84155 ASSAY OF PROTEIN SERUM: CPT | Performed by: EMERGENCY MEDICINE

## 2025-08-18 PROCEDURE — 96361 HYDRATE IV INFUSION ADD-ON: CPT

## 2025-08-18 PROCEDURE — 36415 COLL VENOUS BLD VENIPUNCTURE: CPT | Performed by: EMERGENCY MEDICINE

## 2025-08-18 PROCEDURE — 83690 ASSAY OF LIPASE: CPT | Performed by: EMERGENCY MEDICINE

## 2025-08-18 PROCEDURE — 96375 TX/PRO/DX INJ NEW DRUG ADDON: CPT

## 2025-08-18 PROCEDURE — 74177 CT ABD & PELVIS W/CONTRAST: CPT

## 2025-08-18 PROCEDURE — 258N000003 HC RX IP 258 OP 636: Performed by: EMERGENCY MEDICINE

## 2025-08-18 PROCEDURE — 250N000011 HC RX IP 250 OP 636: Performed by: EMERGENCY MEDICINE

## 2025-08-18 PROCEDURE — 85004 AUTOMATED DIFF WBC COUNT: CPT | Performed by: EMERGENCY MEDICINE

## 2025-08-18 PROCEDURE — 81001 URINALYSIS AUTO W/SCOPE: CPT | Performed by: EMERGENCY MEDICINE

## 2025-08-18 PROCEDURE — 99285 EMERGENCY DEPT VISIT HI MDM: CPT | Mod: 25 | Performed by: EMERGENCY MEDICINE

## 2025-08-18 PROCEDURE — 96374 THER/PROPH/DIAG INJ IV PUSH: CPT | Mod: 59

## 2025-08-18 RX ORDER — KETOROLAC TROMETHAMINE 30 MG/ML
30 INJECTION, SOLUTION INTRAMUSCULAR; INTRAVENOUS ONCE
Status: COMPLETED | OUTPATIENT
Start: 2025-08-18 | End: 2025-08-18

## 2025-08-18 RX ORDER — ONDANSETRON 2 MG/ML
4 INJECTION INTRAMUSCULAR; INTRAVENOUS ONCE
Status: COMPLETED | OUTPATIENT
Start: 2025-08-18 | End: 2025-08-18

## 2025-08-18 RX ADMIN — PANTOPRAZOLE SODIUM 40 MG: 40 INJECTION, POWDER, FOR SOLUTION INTRAVENOUS at 23:00

## 2025-08-18 RX ADMIN — ONDANSETRON 4 MG: 2 INJECTION, SOLUTION INTRAMUSCULAR; INTRAVENOUS at 22:56

## 2025-08-18 RX ADMIN — SODIUM CHLORIDE 1000 ML: 9 INJECTION, SOLUTION INTRAVENOUS at 22:55

## 2025-08-18 RX ADMIN — KETOROLAC TROMETHAMINE 30 MG: 30 INJECTION, SOLUTION INTRAMUSCULAR at 22:58

## 2025-08-18 ASSESSMENT — COLUMBIA-SUICIDE SEVERITY RATING SCALE - C-SSRS
2. HAVE YOU ACTUALLY HAD ANY THOUGHTS OF KILLING YOURSELF IN THE PAST MONTH?: NO
6. HAVE YOU EVER DONE ANYTHING, STARTED TO DO ANYTHING, OR PREPARED TO DO ANYTHING TO END YOUR LIFE?: YES
1. IN THE PAST MONTH, HAVE YOU WISHED YOU WERE DEAD OR WISHED YOU COULD GO TO SLEEP AND NOT WAKE UP?: NO

## 2025-08-18 ASSESSMENT — ENCOUNTER SYMPTOMS
FEVER: 0
ABDOMINAL PAIN: 1
DYSURIA: 0
VOMITING: 1
COUGH: 0
SHORTNESS OF BREATH: 0
DIARRHEA: 1
NAUSEA: 1

## 2025-08-19 VITALS
TEMPERATURE: 98.4 F | HEIGHT: 67 IN | DIASTOLIC BLOOD PRESSURE: 73 MMHG | OXYGEN SATURATION: 99 % | HEART RATE: 68 BPM | WEIGHT: 194.6 LBS | BODY MASS INDEX: 30.54 KG/M2 | SYSTOLIC BLOOD PRESSURE: 132 MMHG | RESPIRATION RATE: 20 BRPM

## 2025-08-19 PROCEDURE — 96361 HYDRATE IV INFUSION ADD-ON: CPT

## 2025-08-19 PROCEDURE — 250N000011 HC RX IP 250 OP 636: Performed by: EMERGENCY MEDICINE

## 2025-08-19 RX ORDER — IOPAMIDOL 755 MG/ML
90 INJECTION, SOLUTION INTRAVASCULAR ONCE
Status: COMPLETED | OUTPATIENT
Start: 2025-08-19 | End: 2025-08-19

## 2025-08-19 RX ADMIN — IOPAMIDOL 90 ML: 755 INJECTION, SOLUTION INTRAVENOUS at 00:17

## 2025-08-21 ENCOUNTER — ANCILLARY PROCEDURE (OUTPATIENT)
Dept: MRI IMAGING | Facility: CLINIC | Age: 25
End: 2025-08-21
Attending: FAMILY MEDICINE
Payer: COMMERCIAL

## 2025-08-21 DIAGNOSIS — M79.605 LUMBAR PAIN WITH RADIATION DOWN BOTH LEGS: ICD-10-CM

## 2025-08-21 DIAGNOSIS — M54.50 LUMBAR PAIN WITH RADIATION DOWN BOTH LEGS: ICD-10-CM

## 2025-08-21 DIAGNOSIS — M79.604 LUMBAR PAIN WITH RADIATION DOWN BOTH LEGS: ICD-10-CM

## 2025-08-21 PROCEDURE — 72148 MRI LUMBAR SPINE W/O DYE: CPT | Mod: TC | Performed by: STUDENT IN AN ORGANIZED HEALTH CARE EDUCATION/TRAINING PROGRAM

## (undated) RX ORDER — LIDOCAINE HYDROCHLORIDE 10 MG/ML
INJECTION, SOLUTION EPIDURAL; INFILTRATION; INTRACAUDAL; PERINEURAL
Status: DISPENSED
Start: 2017-07-07